# Patient Record
Sex: FEMALE | Race: WHITE | NOT HISPANIC OR LATINO | URBAN - METROPOLITAN AREA
[De-identification: names, ages, dates, MRNs, and addresses within clinical notes are randomized per-mention and may not be internally consistent; named-entity substitution may affect disease eponyms.]

---

## 2018-07-19 ENCOUNTER — INPATIENT (INPATIENT)
Facility: HOSPITAL | Age: 83
LOS: 6 days | Discharge: HOSPICE | End: 2018-07-26
Attending: INTERNAL MEDICINE | Admitting: INTERNAL MEDICINE
Payer: MEDICARE

## 2018-07-19 VITALS
OXYGEN SATURATION: 100 % | WEIGHT: 139.99 LBS | HEART RATE: 88 BPM | SYSTOLIC BLOOD PRESSURE: 167 MMHG | HEIGHT: 67 IN | DIASTOLIC BLOOD PRESSURE: 72 MMHG | RESPIRATION RATE: 20 BRPM

## 2018-07-19 LAB
ABO RH CONFIRMATION: SIGNIFICANT CHANGE UP
ALBUMIN SERPL ELPH-MCNC: 2.4 G/DL — LOW (ref 3.3–5)
ALP SERPL-CCNC: 65 U/L — SIGNIFICANT CHANGE UP (ref 40–120)
ALT FLD-CCNC: 38 U/L — SIGNIFICANT CHANGE UP (ref 12–78)
AMMONIA BLD-MCNC: 24 UMOL/L — SIGNIFICANT CHANGE UP (ref 11–32)
ANION GAP SERPL CALC-SCNC: 11 MMOL/L — SIGNIFICANT CHANGE UP (ref 5–17)
APPEARANCE UR: ABNORMAL
APTT BLD: 40.3 SEC — HIGH (ref 27.5–37.4)
AST SERPL-CCNC: 16 U/L — SIGNIFICANT CHANGE UP (ref 15–37)
BACTERIA # UR AUTO: ABNORMAL
BASE EXCESS BLDA CALC-SCNC: 0.5 MMOL/L — SIGNIFICANT CHANGE UP (ref -2–2)
BASOPHILS # BLD AUTO: 0.03 K/UL — SIGNIFICANT CHANGE UP (ref 0–0.2)
BASOPHILS NFR BLD AUTO: 0.4 % — SIGNIFICANT CHANGE UP (ref 0–2)
BILIRUB SERPL-MCNC: 0.6 MG/DL — SIGNIFICANT CHANGE UP (ref 0.2–1.2)
BILIRUB UR-MCNC: NEGATIVE — SIGNIFICANT CHANGE UP
BLD GP AB SCN SERPL QL: SIGNIFICANT CHANGE UP
BLOOD GAS COMMENTS ARTERIAL: SIGNIFICANT CHANGE UP
BUN SERPL-MCNC: 40 MG/DL — HIGH (ref 7–23)
CALCIUM SERPL-MCNC: 9.1 MG/DL — SIGNIFICANT CHANGE UP (ref 8.5–10.1)
CHLORIDE SERPL-SCNC: 110 MMOL/L — HIGH (ref 96–108)
CK SERPL-CCNC: 25 U/L — LOW (ref 26–192)
CK SERPL-CCNC: 38 U/L — SIGNIFICANT CHANGE UP (ref 26–192)
CO2 SERPL-SCNC: 22 MMOL/L — SIGNIFICANT CHANGE UP (ref 22–31)
COLOR SPEC: YELLOW — SIGNIFICANT CHANGE UP
CREAT SERPL-MCNC: 1.62 MG/DL — HIGH (ref 0.5–1.3)
DIFF PNL FLD: ABNORMAL
EOSINOPHIL # BLD AUTO: 0.17 K/UL — SIGNIFICANT CHANGE UP (ref 0–0.5)
EOSINOPHIL NFR BLD AUTO: 2.2 % — SIGNIFICANT CHANGE UP (ref 0–6)
EPI CELLS # UR: SIGNIFICANT CHANGE UP
ERYTHROCYTE [SEDIMENTATION RATE] IN BLOOD: 87 MM/HR — HIGH (ref 0–20)
GAS PNL BLDA: SIGNIFICANT CHANGE UP
GLUCOSE SERPL-MCNC: 102 MG/DL — HIGH (ref 70–99)
GLUCOSE UR QL: NEGATIVE MG/DL — SIGNIFICANT CHANGE UP
HCO3 BLDA-SCNC: 22 MMOL/L — SIGNIFICANT CHANGE UP (ref 21–29)
HCT VFR BLD CALC: 33.4 % — LOW (ref 34.5–45)
HGB BLD-MCNC: 10.9 G/DL — LOW (ref 11.5–15.5)
HOROWITZ INDEX BLDA+IHG-RTO: 32 — SIGNIFICANT CHANGE UP
IMM GRANULOCYTES NFR BLD AUTO: 1.3 % — SIGNIFICANT CHANGE UP (ref 0–1.5)
INR BLD: 1.24 RATIO — HIGH (ref 0.88–1.16)
KETONES UR-MCNC: ABNORMAL
LACTATE SERPL-SCNC: 1 MMOL/L — SIGNIFICANT CHANGE UP (ref 0.7–2)
LEUKOCYTE ESTERASE UR-ACNC: ABNORMAL
LIDOCAIN IGE QN: 419 U/L — HIGH (ref 73–393)
LYMPHOCYTES # BLD AUTO: 1.04 K/UL — SIGNIFICANT CHANGE UP (ref 1–3.3)
LYMPHOCYTES # BLD AUTO: 13.7 % — SIGNIFICANT CHANGE UP (ref 13–44)
MAGNESIUM SERPL-MCNC: 2 MG/DL — SIGNIFICANT CHANGE UP (ref 1.6–2.6)
MCHC RBC-ENTMCNC: 30.9 PG — SIGNIFICANT CHANGE UP (ref 27–34)
MCHC RBC-ENTMCNC: 32.6 GM/DL — SIGNIFICANT CHANGE UP (ref 32–36)
MCV RBC AUTO: 94.6 FL — SIGNIFICANT CHANGE UP (ref 80–100)
MONOCYTES # BLD AUTO: 0.74 K/UL — SIGNIFICANT CHANGE UP (ref 0–0.9)
MONOCYTES NFR BLD AUTO: 9.7 % — SIGNIFICANT CHANGE UP (ref 2–14)
NEUTROPHILS # BLD AUTO: 5.51 K/UL — SIGNIFICANT CHANGE UP (ref 1.8–7.4)
NEUTROPHILS NFR BLD AUTO: 72.7 % — SIGNIFICANT CHANGE UP (ref 43–77)
NITRITE UR-MCNC: POSITIVE
NRBC # BLD: 0 /100 WBCS — SIGNIFICANT CHANGE UP (ref 0–0)
NT-PROBNP SERPL-SCNC: 583 PG/ML — HIGH (ref 0–450)
PCO2 BLDA: 26 MMHG — LOW (ref 32–46)
PH BLDA: 7.54 — HIGH (ref 7.35–7.45)
PH UR: 6 — SIGNIFICANT CHANGE UP (ref 5–8)
PLATELET # BLD AUTO: 335 K/UL — SIGNIFICANT CHANGE UP (ref 150–400)
PO2 BLDA: 139 MMHG — HIGH (ref 74–108)
POTASSIUM SERPL-MCNC: 3.7 MMOL/L — SIGNIFICANT CHANGE UP (ref 3.5–5.3)
POTASSIUM SERPL-SCNC: 3.7 MMOL/L — SIGNIFICANT CHANGE UP (ref 3.5–5.3)
PROT SERPL-MCNC: 6.6 GM/DL — SIGNIFICANT CHANGE UP (ref 6–8.3)
PROT UR-MCNC: 30 MG/DL
PROTHROM AB SERPL-ACNC: 13.5 SEC — HIGH (ref 9.8–12.7)
RBC # BLD: 3.53 M/UL — LOW (ref 3.8–5.2)
RBC # FLD: 13 % — SIGNIFICANT CHANGE UP (ref 10.3–14.5)
RBC CASTS # UR COMP ASSIST: ABNORMAL /HPF (ref 0–4)
SAO2 % BLDA: 99 % — HIGH (ref 92–96)
SODIUM SERPL-SCNC: 143 MMOL/L — SIGNIFICANT CHANGE UP (ref 135–145)
SP GR SPEC: 1.01 — SIGNIFICANT CHANGE UP (ref 1.01–1.02)
TROPONIN I SERPL-MCNC: <0.015 NG/ML — SIGNIFICANT CHANGE UP (ref 0.01–0.04)
TROPONIN I SERPL-MCNC: <0.015 NG/ML — SIGNIFICANT CHANGE UP (ref 0.01–0.04)
TSH SERPL-MCNC: 2.58 UU/ML — SIGNIFICANT CHANGE UP (ref 0.36–3.74)
TYPE + AB SCN PNL BLD: SIGNIFICANT CHANGE UP
UROBILINOGEN FLD QL: NEGATIVE MG/DL — SIGNIFICANT CHANGE UP
WBC # BLD: 7.59 K/UL — SIGNIFICANT CHANGE UP (ref 3.8–10.5)
WBC # FLD AUTO: 7.59 K/UL — SIGNIFICANT CHANGE UP (ref 3.8–10.5)
WBC UR QL: >50

## 2018-07-19 PROCEDURE — 71250 CT THORAX DX C-: CPT | Mod: 26

## 2018-07-19 PROCEDURE — 73060 X-RAY EXAM OF HUMERUS: CPT | Mod: 26,LT

## 2018-07-19 PROCEDURE — 93970 EXTREMITY STUDY: CPT | Mod: 26

## 2018-07-19 PROCEDURE — 74176 CT ABD & PELVIS W/O CONTRAST: CPT | Mod: 26

## 2018-07-19 PROCEDURE — 73030 X-RAY EXAM OF SHOULDER: CPT | Mod: 26,LT

## 2018-07-19 PROCEDURE — 71045 X-RAY EXAM CHEST 1 VIEW: CPT | Mod: 26

## 2018-07-19 PROCEDURE — 70450 CT HEAD/BRAIN W/O DYE: CPT | Mod: 26

## 2018-07-19 PROCEDURE — 99285 EMERGENCY DEPT VISIT HI MDM: CPT

## 2018-07-19 PROCEDURE — 93010 ELECTROCARDIOGRAM REPORT: CPT

## 2018-07-19 RX ORDER — CEFTRIAXONE 500 MG/1
1000 INJECTION, POWDER, FOR SOLUTION INTRAMUSCULAR; INTRAVENOUS ONCE
Qty: 0 | Refills: 0 | Status: COMPLETED | OUTPATIENT
Start: 2018-07-19 | End: 2018-07-19

## 2018-07-19 RX ORDER — ONDANSETRON 8 MG/1
4 TABLET, FILM COATED ORAL EVERY 6 HOURS
Qty: 0 | Refills: 0 | Status: DISCONTINUED | OUTPATIENT
Start: 2018-07-19 | End: 2018-07-26

## 2018-07-19 RX ORDER — MODAFINIL 200 MG/1
100 TABLET ORAL DAILY
Qty: 0 | Refills: 0 | Status: DISCONTINUED | OUTPATIENT
Start: 2018-07-19 | End: 2018-07-26

## 2018-07-19 RX ORDER — LEVOTHYROXINE SODIUM 125 MCG
1 TABLET ORAL
Qty: 0 | Refills: 0 | COMMUNITY

## 2018-07-19 RX ORDER — HEPARIN SODIUM 5000 [USP'U]/ML
5000 INJECTION INTRAVENOUS; SUBCUTANEOUS EVERY 12 HOURS
Qty: 0 | Refills: 0 | Status: DISCONTINUED | OUTPATIENT
Start: 2018-07-19 | End: 2018-07-26

## 2018-07-19 RX ORDER — SENNA PLUS 8.6 MG/1
2 TABLET ORAL AT BEDTIME
Qty: 0 | Refills: 0 | Status: DISCONTINUED | OUTPATIENT
Start: 2018-07-19 | End: 2018-07-26

## 2018-07-19 RX ORDER — MUPIROCIN 20 MG/G
1 OINTMENT TOPICAL EVERY 12 HOURS
Qty: 0 | Refills: 0 | Status: DISCONTINUED | OUTPATIENT
Start: 2018-07-19 | End: 2018-07-26

## 2018-07-19 RX ORDER — ASPIRIN/CALCIUM CARB/MAGNESIUM 324 MG
81 TABLET ORAL DAILY
Qty: 0 | Refills: 0 | Status: DISCONTINUED | OUTPATIENT
Start: 2018-07-19 | End: 2018-07-26

## 2018-07-19 RX ORDER — CEFTRIAXONE 500 MG/1
1 INJECTION, POWDER, FOR SOLUTION INTRAMUSCULAR; INTRAVENOUS EVERY 24 HOURS
Qty: 0 | Refills: 0 | Status: DISCONTINUED | OUTPATIENT
Start: 2018-07-19 | End: 2018-07-19

## 2018-07-19 RX ORDER — POLYETHYLENE GLYCOL 3350 17 G/17G
17 POWDER, FOR SOLUTION ORAL DAILY
Qty: 0 | Refills: 0 | Status: DISCONTINUED | OUTPATIENT
Start: 2018-07-19 | End: 2018-07-20

## 2018-07-19 RX ORDER — PANTOPRAZOLE SODIUM 20 MG/1
1 TABLET, DELAYED RELEASE ORAL
Qty: 0 | Refills: 0 | COMMUNITY

## 2018-07-19 RX ORDER — LIDOCAINE 4 G/100G
2 CREAM TOPICAL DAILY
Qty: 0 | Refills: 0 | Status: DISCONTINUED | OUTPATIENT
Start: 2018-07-19 | End: 2018-07-26

## 2018-07-19 RX ORDER — ACETAMINOPHEN 500 MG
650 TABLET ORAL EVERY 8 HOURS
Qty: 0 | Refills: 0 | Status: DISCONTINUED | OUTPATIENT
Start: 2018-07-19 | End: 2018-07-26

## 2018-07-19 RX ORDER — LEVOTHYROXINE SODIUM 125 MCG
25 TABLET ORAL DAILY
Qty: 0 | Refills: 0 | Status: DISCONTINUED | OUTPATIENT
Start: 2018-07-19 | End: 2018-07-26

## 2018-07-19 RX ORDER — DEXTROSE MONOHYDRATE, SODIUM CHLORIDE, AND POTASSIUM CHLORIDE 50; .745; 4.5 G/1000ML; G/1000ML; G/1000ML
1000 INJECTION, SOLUTION INTRAVENOUS
Qty: 0 | Refills: 0 | Status: DISCONTINUED | OUTPATIENT
Start: 2018-07-19 | End: 2018-07-26

## 2018-07-19 RX ORDER — CARVEDILOL PHOSPHATE 80 MG/1
12.5 CAPSULE, EXTENDED RELEASE ORAL EVERY 12 HOURS
Qty: 0 | Refills: 0 | Status: DISCONTINUED | OUTPATIENT
Start: 2018-07-19 | End: 2018-07-26

## 2018-07-19 RX ORDER — PANTOPRAZOLE SODIUM 20 MG/1
40 TABLET, DELAYED RELEASE ORAL
Qty: 0 | Refills: 0 | Status: DISCONTINUED | OUTPATIENT
Start: 2018-07-19 | End: 2018-07-26

## 2018-07-19 RX ORDER — CEFTRIAXONE 500 MG/1
1000 INJECTION, POWDER, FOR SOLUTION INTRAMUSCULAR; INTRAVENOUS EVERY 24 HOURS
Qty: 0 | Refills: 0 | Status: DISCONTINUED | OUTPATIENT
Start: 2018-07-19 | End: 2018-07-24

## 2018-07-19 RX ORDER — ATORVASTATIN CALCIUM 80 MG/1
40 TABLET, FILM COATED ORAL AT BEDTIME
Qty: 0 | Refills: 0 | Status: DISCONTINUED | OUTPATIENT
Start: 2018-07-19 | End: 2018-07-26

## 2018-07-19 RX ORDER — DOCUSATE SODIUM 100 MG
100 CAPSULE ORAL THREE TIMES A DAY
Qty: 0 | Refills: 0 | Status: DISCONTINUED | OUTPATIENT
Start: 2018-07-19 | End: 2018-07-26

## 2018-07-19 RX ORDER — ACETAMINOPHEN 500 MG
1000 TABLET ORAL ONCE
Qty: 0 | Refills: 0 | Status: COMPLETED | OUTPATIENT
Start: 2018-07-19 | End: 2018-07-19

## 2018-07-19 RX ADMIN — CARVEDILOL PHOSPHATE 12.5 MILLIGRAM(S): 80 CAPSULE, EXTENDED RELEASE ORAL at 18:55

## 2018-07-19 RX ADMIN — CEFTRIAXONE 1000 MILLIGRAM(S): 500 INJECTION, POWDER, FOR SOLUTION INTRAMUSCULAR; INTRAVENOUS at 14:55

## 2018-07-19 RX ADMIN — LIDOCAINE 2 PATCH: 4 CREAM TOPICAL at 18:54

## 2018-07-19 RX ADMIN — DEXTROSE MONOHYDRATE, SODIUM CHLORIDE, AND POTASSIUM CHLORIDE 50 MILLILITER(S): 50; .745; 4.5 INJECTION, SOLUTION INTRAVENOUS at 22:27

## 2018-07-19 RX ADMIN — HEPARIN SODIUM 5000 UNIT(S): 5000 INJECTION INTRAVENOUS; SUBCUTANEOUS at 18:55

## 2018-07-19 RX ADMIN — Medication 400 MILLIGRAM(S): at 18:53

## 2018-07-19 NOTE — H&P ADULT - PMH
Back pain    Depression    Dysphagia    HTN (hypertension)    Hypothyroidism    Narcolepsy and cataplexy    Shoulder pain, left    Urinary retention

## 2018-07-19 NOTE — ED ADULT NURSE REASSESSMENT NOTE - NS ED NURSE REASSESS COMMENT FT1
bedside ultrasound done, pt transported to [facility]
Patient resting comfortably in bed, VSS. Awaiting CT scan results. Hospitalist at bedside, awaiting admitting orders.  at bedside.

## 2018-07-19 NOTE — ED PROVIDER NOTE - CARE PLAN
Principal Discharge DX:	Altered mental status Principal Discharge DX:	UTI (urinary tract infection)  Secondary Diagnosis:	Metabolic encephalopathy

## 2018-07-19 NOTE — ED ADULT NURSE NOTE - OBJECTIVE STATEMENT
Patient is 89 year old female from [place] rehab comes to ER for increase in lethargy. Patient was seen recently at [facility]. All results WNL. Patient d/c to [place] for rehab. Patient recently placed on new depression meds, she was seen to have change in mental status. Med discontinued. On arrival patient afebrile. Skin tear noted to inner right thigh, left calf, and right hand. Prior to arrival patient had #24 IV placed in right hand from [place] with noticeable skin tear, unable to flush IV. IV removed, Skin tear bandaged. Patient had metcalf prior to arrival. Metcalf changed and draining clear yellow urine. Multiple attempts made for IV access and blood work. IV team got #22 in right AC, flushing. Mult attempts for blood work, unsuccessful, MD made aware. Patient  is at bedside.

## 2018-07-19 NOTE — ED ADULT TRIAGE NOTE - CHIEF COMPLAINT QUOTE
increased lethargy, change in LOC x 3 weeks. In rehab, daughter feels like she is not being treated right and wanted to be brought to .

## 2018-07-19 NOTE — ED PROVIDER NOTE - SKIN, MLM
Skin normal color for race, warm, dry. No evidence of rash. +diffuse ecchymosis on UE, LE at various stages.

## 2018-07-19 NOTE — ED PROVIDER NOTE - OBJECTIVE STATEMENT
89 y/o female with PMHx of depression, HTN presents to the ED c/o AMS and lethargy starting 3 weeks ago. Pt was seen at [facility] for weakness/dizziness 3 weeks ago. As per daughter pt has been antidepressants all her life, but was recently switched from 100mg to 50mg 6 months ago and was recently changed to 25mg when pt began experiencing weakness the doctor increased the dose. While pt was at [place] she was in and out of consciousness and unresponsive, they did labs, MRI and other tests were all normal. Pt was sent to rehab, but family wasn't satisfied that pt was still unresponsive at time and decided to bring her to [facility] for evaluation. HPI obtained from family as pt is not responding to questions at this time.

## 2018-07-19 NOTE — H&P ADULT - NSHPPHYSICALEXAM_GEN_ALL_CORE
PHYSICAL EXAM:    Constitutional: lethargic, awake    HEENT: PERR, EOMI, Normal Hearing, MMM  Neck: Soft and supple, No LAD, No JVD  Respiratory: Breath sounds decreased at bases,  No wheezing, rales , + occasional  rhonchi  Cardiovascular: S1 and S2, regular rate and rhythm, no Murmurs, gallops or rubs  Gastrointestinal: Bowel Sounds present, soft, diffuse tenderness, mildly distended, no guarding, no rebound  Extremities: + 2  peripheral edema  Vascular: 1+ peripheral pulses  Neurological: A/O x 0, unable to perform due to AMS   Musculoskeletal:  LUE deformity around the shoulder, multiple superficial bruises on the arm and legs, skin tear on the inner right thigh  Skin: multiple skin tear on the right leg, + bruises   rectal : deferred, not indicated

## 2018-07-19 NOTE — H&P ADULT - ASSESSMENT
89 y/o female with PMHx of  long standing h/o depression was weaned by PCP  of desipramine recently, Narcolepsy , GERD, Urinary retention s/p Dill , Constipation, Iron deficiency  HTN, Hypothyroidism, Dysphagia  presents to the   from [facility]   with worsening of lethargy starting 3 weeks ago. Pt was seen at [facility] for weakness/dizziness 3 weeks ago, all work-up was negative .  As per daughter at bedside pt  was in and out of consciousness and unresponsive during hospitalization. MRI and other tests were all normal.  Patient awake, speaks some words but unable to provide any history. Patient looks uncomfortable. Patient is nonambulatory  recently , has h/o left arm fracture with defocrmity     in ED T(F): 99.1 , Max: 99.1  HR: 86  (86 - 98) BP: 164/74 (129/80 - 167/72) RR: 18  (17 - 20) SpO2: 100%  (100% - 100%) on 4 L , EKG - sinus 95, NSST changes, troponin x 1 neg, TSH wnl, Cr 1.62, BUN 40 , Hb 10.9, alb 2.4, CK 25,   s/p 1 gm of ceftriaxone, CXR - ?LLL PNA, official reading is pending    * Metabolic encephalopathy due to UTI , HAILEY vs CVA  vs worsening of dementia vs narcolepsy   * UTI   - tele  - serial troponin, ekg  - 2 d echo  - MRI/MRA  - neurology, cardiology evaluation  - f/u cultures  - CT head - large left mastoid air effusion - will do MRI for better evaluation  - CT chest /abd /pelv to r/o source   - ID evaluation  - check TSH, B12   - speech and swallow evaluation  - PT   - c/w ASA, statins    * HAILEY   * h/o Urinary retention s/p Dill   - IV fluids - gentle   - repeat in am  - CT abd to r/o obstruction     * Dyspnea  r/o PNA  vs PE   - CT chest  w/o contrast due to HAILEY , consider PE study once renal function improves  - doppler of LE    * MIld anemia  - iron studies  - hold iron for now due to constipation, consider IV iron infusion while in patient  - check ferritin, B12, folate    * Narcolepsy   - modafinil 100 standing    * HTN  - hold ARBs due to HAILEY  - hold nifedipine due to leg edema  ? CHF ( elevated )   - c/w coreg 25 bid  - monitor    * GERD  - c/w PPI    * Dysphagia  - speech and swallow evaluation  - nutirtional consult    * Skin tears, pressure ulcers  - wound care consult    * Advanced directives - 30 minutes spend  -  at  bedside - FULL CODE        DVT proph - heparin      Time spend 70 min

## 2018-07-19 NOTE — H&P ADULT - HISTORY OF PRESENT ILLNESS
89 y/o female with PMHx of  long standing h/o depression was weaned by PCP  of desipramine recently, Narcolepsy , GERD, Urinary retention s/p Dill , Constipation, Iron deficiency  HTN, Hypothyroidism, Dysphagia  presents to the   from [facility]   with worsening of lethargy starting 3 weeks ago. Pt was seen at [facility] for weakness/dizziness 3 weeks ago, all work-up was negative .  As per daughter at bedside pt  was in and out of consciousness and unresponsive during hospitalization. MRI and other tests were all normal.  Patient awake, speaks some words but unable to provide any history. Patient looks uncomfortable. Patient is nonambulatory  recently , has h/o left arm fracture with defocrmity     in ED T(F): 99.1 , Max: 99.1  HR: 86  (86 - 98) BP: 164/74 (129/80 - 167/72) RR: 18  (17 - 20) SpO2: 100%  (100% - 100%) on 4 L , EKG - sinus 95, NSST changes, troponin x 1 neg, TSH wnl, Cr 1.62, BUN 40 , Hb 10.9, alb 2.4, CK 25,   s/p 1 gm of ceftriaxone, CXR - ?LLL PNA, official reading is pending

## 2018-07-20 LAB
ANION GAP SERPL CALC-SCNC: 9 MMOL/L — SIGNIFICANT CHANGE UP (ref 5–17)
BASOPHILS # BLD AUTO: 0.02 K/UL — SIGNIFICANT CHANGE UP (ref 0–0.2)
BASOPHILS NFR BLD AUTO: 0.4 % — SIGNIFICANT CHANGE UP (ref 0–2)
BUN SERPL-MCNC: 37 MG/DL — HIGH (ref 7–23)
CALCIUM SERPL-MCNC: 9 MG/DL — SIGNIFICANT CHANGE UP (ref 8.5–10.1)
CHLORIDE SERPL-SCNC: 112 MMOL/L — HIGH (ref 96–108)
CHOLEST SERPL-MCNC: 142 MG/DL — SIGNIFICANT CHANGE UP (ref 10–199)
CK SERPL-CCNC: 35 U/L — SIGNIFICANT CHANGE UP (ref 26–192)
CO2 SERPL-SCNC: 23 MMOL/L — SIGNIFICANT CHANGE UP (ref 22–31)
CREAT SERPL-MCNC: 1.54 MG/DL — HIGH (ref 0.5–1.3)
CRP SERPL-MCNC: 3.07 MG/DL — HIGH (ref 0–0.4)
EOSINOPHIL # BLD AUTO: 0.17 K/UL — SIGNIFICANT CHANGE UP (ref 0–0.5)
EOSINOPHIL NFR BLD AUTO: 3.2 % — SIGNIFICANT CHANGE UP (ref 0–6)
FERRITIN SERPL-MCNC: 592 NG/ML — HIGH (ref 15–150)
FOLATE SERPL-MCNC: 6.2 NG/ML — SIGNIFICANT CHANGE UP
GLUCOSE SERPL-MCNC: 119 MG/DL — HIGH (ref 70–99)
HCT VFR BLD CALC: 30.1 % — LOW (ref 34.5–45)
HDLC SERPL-MCNC: 31 MG/DL — LOW (ref 40–125)
HGB BLD-MCNC: 10 G/DL — LOW (ref 11.5–15.5)
IMM GRANULOCYTES NFR BLD AUTO: 1.5 % — SIGNIFICANT CHANGE UP (ref 0–1.5)
IRON SATN MFR SERPL: 16 % — SIGNIFICANT CHANGE UP (ref 14–50)
IRON SATN MFR SERPL: 24 UG/DL — LOW (ref 30–160)
LIPID PNL WITH DIRECT LDL SERPL: 76 MG/DL — SIGNIFICANT CHANGE UP
LYMPHOCYTES # BLD AUTO: 0.96 K/UL — LOW (ref 1–3.3)
LYMPHOCYTES # BLD AUTO: 18.3 % — SIGNIFICANT CHANGE UP (ref 13–44)
MAGNESIUM SERPL-MCNC: 2.1 MG/DL — SIGNIFICANT CHANGE UP (ref 1.6–2.6)
MCHC RBC-ENTMCNC: 30.7 PG — SIGNIFICANT CHANGE UP (ref 27–34)
MCHC RBC-ENTMCNC: 33.2 GM/DL — SIGNIFICANT CHANGE UP (ref 32–36)
MCV RBC AUTO: 92.3 FL — SIGNIFICANT CHANGE UP (ref 80–100)
MONOCYTES # BLD AUTO: 0.56 K/UL — SIGNIFICANT CHANGE UP (ref 0–0.9)
MONOCYTES NFR BLD AUTO: 10.7 % — SIGNIFICANT CHANGE UP (ref 2–14)
NEUTROPHILS # BLD AUTO: 3.46 K/UL — SIGNIFICANT CHANGE UP (ref 1.8–7.4)
NEUTROPHILS NFR BLD AUTO: 65.9 % — SIGNIFICANT CHANGE UP (ref 43–77)
NRBC # BLD: 0 /100 WBCS — SIGNIFICANT CHANGE UP (ref 0–0)
NT-PROBNP SERPL-SCNC: 828 PG/ML — HIGH (ref 0–450)
PHOSPHATE SERPL-MCNC: 2.7 MG/DL — SIGNIFICANT CHANGE UP (ref 2.5–4.5)
PLATELET # BLD AUTO: 314 K/UL — SIGNIFICANT CHANGE UP (ref 150–400)
POTASSIUM SERPL-MCNC: 3.7 MMOL/L — SIGNIFICANT CHANGE UP (ref 3.5–5.3)
POTASSIUM SERPL-SCNC: 3.7 MMOL/L — SIGNIFICANT CHANGE UP (ref 3.5–5.3)
RBC # BLD: 3.26 M/UL — LOW (ref 3.8–5.2)
RBC # FLD: 13.2 % — SIGNIFICANT CHANGE UP (ref 10.3–14.5)
SODIUM SERPL-SCNC: 144 MMOL/L — SIGNIFICANT CHANGE UP (ref 135–145)
TIBC SERPL-MCNC: 149 UG/DL — LOW (ref 220–430)
TOTAL CHOLESTEROL/HDL RATIO MEASUREMENT: 4.6 RATIO — SIGNIFICANT CHANGE UP (ref 3.3–7.1)
TRIGL SERPL-MCNC: 174 MG/DL — HIGH (ref 10–149)
TROPONIN I SERPL-MCNC: 0.01 NG/ML — SIGNIFICANT CHANGE UP (ref 0.01–0.04)
TSH SERPL-MCNC: 3.65 UU/ML — SIGNIFICANT CHANGE UP (ref 0.36–3.74)
UIBC SERPL-MCNC: 125 UG/DL — SIGNIFICANT CHANGE UP (ref 110–370)
VIT B12 SERPL-MCNC: 969 PG/ML — SIGNIFICANT CHANGE UP (ref 232–1245)
WBC # BLD: 5.25 K/UL — SIGNIFICANT CHANGE UP (ref 3.8–10.5)
WBC # FLD AUTO: 5.25 K/UL — SIGNIFICANT CHANGE UP (ref 3.8–10.5)

## 2018-07-20 PROCEDURE — 93010 ELECTROCARDIOGRAM REPORT: CPT

## 2018-07-20 PROCEDURE — 99222 1ST HOSP IP/OBS MODERATE 55: CPT

## 2018-07-20 PROCEDURE — 70547 MR ANGIOGRAPHY NECK W/O DYE: CPT | Mod: 26

## 2018-07-20 PROCEDURE — 73030 X-RAY EXAM OF SHOULDER: CPT | Mod: 26,LT

## 2018-07-20 PROCEDURE — 93306 TTE W/DOPPLER COMPLETE: CPT | Mod: 26

## 2018-07-20 PROCEDURE — 70551 MRI BRAIN STEM W/O DYE: CPT | Mod: 26

## 2018-07-20 RX ORDER — POLYETHYLENE GLYCOL 3350 17 G/17G
17 POWDER, FOR SOLUTION ORAL
Qty: 0 | Refills: 0 | Status: DISCONTINUED | OUTPATIENT
Start: 2018-07-20 | End: 2018-07-26

## 2018-07-20 RX ADMIN — CARVEDILOL PHOSPHATE 12.5 MILLIGRAM(S): 80 CAPSULE, EXTENDED RELEASE ORAL at 17:50

## 2018-07-20 RX ADMIN — MUPIROCIN 1 APPLICATION(S): 20 OINTMENT TOPICAL at 06:41

## 2018-07-20 RX ADMIN — POLYETHYLENE GLYCOL 3350 17 GRAM(S): 17 POWDER, FOR SOLUTION ORAL at 17:51

## 2018-07-20 RX ADMIN — Medication 81 MILLIGRAM(S): at 11:49

## 2018-07-20 RX ADMIN — HEPARIN SODIUM 5000 UNIT(S): 5000 INJECTION INTRAVENOUS; SUBCUTANEOUS at 17:50

## 2018-07-20 RX ADMIN — ATORVASTATIN CALCIUM 40 MILLIGRAM(S): 80 TABLET, FILM COATED ORAL at 21:52

## 2018-07-20 RX ADMIN — Medication 100 MILLIGRAM(S): at 21:53

## 2018-07-20 RX ADMIN — Medication 650 MILLIGRAM(S): at 06:34

## 2018-07-20 RX ADMIN — HEPARIN SODIUM 5000 UNIT(S): 5000 INJECTION INTRAVENOUS; SUBCUTANEOUS at 06:35

## 2018-07-20 RX ADMIN — CARVEDILOL PHOSPHATE 12.5 MILLIGRAM(S): 80 CAPSULE, EXTENDED RELEASE ORAL at 06:34

## 2018-07-20 RX ADMIN — LIDOCAINE 2 PATCH: 4 CREAM TOPICAL at 11:44

## 2018-07-20 RX ADMIN — Medication 1 TABLET(S): at 11:49

## 2018-07-20 RX ADMIN — Medication 650 MILLIGRAM(S): at 13:53

## 2018-07-20 RX ADMIN — Medication 650 MILLIGRAM(S): at 21:52

## 2018-07-20 RX ADMIN — MODAFINIL 100 MILLIGRAM(S): 200 TABLET ORAL at 13:53

## 2018-07-20 RX ADMIN — LIDOCAINE 2 PATCH: 4 CREAM TOPICAL at 07:00

## 2018-07-20 RX ADMIN — CEFTRIAXONE 1000 MILLIGRAM(S): 500 INJECTION, POWDER, FOR SOLUTION INTRAMUSCULAR; INTRAVENOUS at 11:43

## 2018-07-20 RX ADMIN — MUPIROCIN 1 APPLICATION(S): 20 OINTMENT TOPICAL at 17:50

## 2018-07-20 RX ADMIN — SENNA PLUS 2 TABLET(S): 8.6 TABLET ORAL at 21:52

## 2018-07-20 RX ADMIN — POLYETHYLENE GLYCOL 3350 17 GRAM(S): 17 POWDER, FOR SOLUTION ORAL at 11:44

## 2018-07-20 NOTE — SWALLOW BEDSIDE ASSESSMENT ADULT - PHARYNGEAL PHASE
Swallow triggered in an acceptable time frame for age with age acceptable laryngeal lift on palpation during swallowing trials. No behavioral aspiration signs exhibited on exam. Odynophagia was denied.

## 2018-07-20 NOTE — CONSULT NOTE ADULT - ASSESSMENT
89 year old woman with chronic depression with subacute presentation of encephalopathy with reported negative workup at [place].  As per the patient's daughter, there is no history of dementia.  Likely a component of toxic encephalopathy in the setting of UTI. However, it is unclear when this problem began. Presumably this was checked during her previous outside hospitalization.  Agree with repeat MRI brain.  There is no reported history of Narcolepsy. She does have somnolence. Narcolepsy does not typically present for the first time at this age. Will try to get more information from  regarding this issue.  That being said, a trial of Modafinil to help with somnolence is warranted and if necessary the dose can be increased from 100 mg/day to 200 mg/day.  Her daughter reports chronic depression and she is only recently off medications. Depression may be playing a role as well. If desipramine (a tricyclic antidepressant) is contraindicated from a cardiac standpoint, can consider starting another class of anti-depressant. Consider psychiatry input.  Physical therapy.  Will follow.

## 2018-07-20 NOTE — SWALLOW BEDSIDE ASSESSMENT ADULT - COMMENTS
The patient was admitted from [facility] with lethargy/altered mentation. This profile is superimposed upon a history of narcolepsy, depression, altered mentation NOS with possible dementia, hypothyroidism, iron deficiency anemia, HTN, HLD, urinary retention s/p Dill placement, Dysphagia NOS, GERD and past left arm fracture with arm deformity.

## 2018-07-20 NOTE — SWALLOW BEDSIDE ASSESSMENT ADULT - ASR SWALLOW LABIAL MOBILITY
Limited probes were notable for generalized reduced effort when executing volitional labial actions but no jeyson lip focality was evident. Note that testing was limited due to reduced participation.

## 2018-07-20 NOTE — SWALLOW BEDSIDE ASSESSMENT ADULT - SWALLOW EVAL: FEEDING ASSISTANCE
PT FED SELF ON EXAM BUT HER WILLINGNESS TO ACCEPT PO WAS REDUCED AT TIMES AND SHE WAS ALSO VARIABLY CONFUSED AT TIMES. THUS, ASSIST SHOULD BE PROVIDED AS NEEDED.

## 2018-07-20 NOTE — PROGRESS NOTE ADULT - ATTENDING COMMENTS
Patient seen and examined.  case reviewed and discussed with NP Melodie, agree with her assessment and plan.  Necessary changes were made.  Patient is lying in bed without any distress.  Discussed with  at bed side regarding management plan.  On IV rocephin for UTI  Follow clinically.

## 2018-07-20 NOTE — PHYSICAL THERAPY INITIAL EVALUATION ADULT - ACTIVE RANGE OF MOTION EXAMINATION, REHAB EVAL
R UE AROM WFL, L UE hand and elbow AROM WFL, L shld NT. B ankle AROM WFL, B hip/knee flex AAROM to 60 deg limited by pt reports of pain, B knee ext WFL

## 2018-07-20 NOTE — PHYSICAL THERAPY INITIAL EVALUATION ADULT - GENERAL OBSERVATIONS, REHAB EVAL
Pt received supine in bed, hard of hearing, states she is always tired and always in pain. Pt vague historian, anxious and tearful at times. Pt currently with Dill cath, NC O2 2L, multiple superficial bruises on arms and legs, multiple skin tears on legs, L shoulder deformity from previous fracture.

## 2018-07-20 NOTE — SWALLOW BEDSIDE ASSESSMENT ADULT - SWALLOW EVAL: CRITERIA FOR SKILLED INTERVENTION MET
I DO NOT FEEL THAT ACUTE SPEECH PATHOLOGY INTERVENTION WOULD CHANGE CLINICAL MANAGEMENT/OUTCOME. MOREOVER, I FEEL THAT HER ORAL DYSPHAGIA AND COGNITIVE DEFICITS ARE CHRONIC PRE-EXISTING CONDITIONS FOR WHICH ACUTE SPEECH PATH INTERVENTION WOULD NOT CHANGE. GIVEN ABOVE, WILL NOT ACTIVELY FOLLOW. RECONSULT PRN AS NEEDED.

## 2018-07-20 NOTE — SWALLOW BEDSIDE ASSESSMENT ADULT - ORAL PREPARATORY PHASE
Pt's willingness to accept Po was psychogenically reduced at times. When she did accept PO, her oral aperture was purposefully reduced but labial grading on utensils was felt to be functional. Pt's willingness to accept PO was psychogenically reduced at times. When she did accept PO, her oral aperture was purposefully reduced but labial grading on utensils was felt to be functional.

## 2018-07-20 NOTE — PHYSICAL THERAPY INITIAL EVALUATION ADULT - CRITERIA FOR SKILLED THERAPEUTIC INTERVENTIONS
functional limitations in following categories/impairments found/therapy frequency/anticipated discharge recommendation/predicted duration of therapy intervention/risk reduction/prevention/rehab potential/anticipated equipment needs at discharge

## 2018-07-20 NOTE — CONSULT NOTE ADULT - ASSESSMENT
89 y/o female with PMHx of  long standing h/o depression was weaned by PCP  of desipramine recently, Narcolepsy , GERD, Urinary retention s/p Metcalf , Constipation, Iron deficiency  HTN, Hypothyroidism, Dysphagia admitted on 7/19 from snf for evaluation of increasing lethargy; history per medical record as patient is unable to provide history.  at bedside unable to provide further information except that each time metcalf is removed it has to be replaced due to lack of her being able to urinate naturally.  1. Patient admitted with lethargy, found to have pyuria, possibly urinary tract infection, has chronic metcalf  - follow up cultures   - reviewed prior medical records to evaluate for resistant or atypical pathogens   - serial cbc and monitor temperature   - iv hydration and supportive care   - agree with ceftriaxone as ordered  2. other issues: depression was weaned by PCP  of desipramine recently, Narcolepsy , GERD, Urinary retention s/p Metcalf , Constipation, Iron deficiency  HTN, Hypothyroidism, Dysphagia   - per medicine

## 2018-07-20 NOTE — SWALLOW BEDSIDE ASSESSMENT ADULT - DIET PRIOR TO ADMI
The patient was reportedly on a EMIL Ground Texture Diet with Thin Liquids at [facility]. The patient was reportedly on a EMIL Ground Texture Diet with Thin Liquids at [facility].

## 2018-07-20 NOTE — SWALLOW BEDSIDE ASSESSMENT ADULT - SWALLOW EVAL: DIAGNOSIS
1) Pt exhibits periodic psychogenically reduced willingness to accept PO atop Oral Dysphagia which subjectively appeared to be a functional condition with a restricted inventory of modified food textures when she is alert enough/willing to accept food. Oral Dysphagia is in setting of variably altered mentation/many missing mandibular teeth. Oral Dysphagia with pre-existing component. Underlying pharyngeal integrity subjectively appeared to be grossly functional/within age acceptable parameters. No behavioral aspiration signs exhibited. Oropharyngeal swallowing integrity likely at pre-hospitalization state.  2)  Pt was alert/interactive but suspectedly [place], internally distractible at times and variably tearful. She was able to verbalize during communicative probes/in conversation without evidence of a primary motor speech or primary linguistic pathology. With that being stated, her utterances reflected variably reduced insight/STM suspect for Cognitive Dysfunction(pre-existing).

## 2018-07-20 NOTE — SWALLOW BEDSIDE ASSESSMENT ADULT - ADDITIONAL RECOMMENDATIONS
1) Talk loudly to pt as she is suspectedly [place].     2) Nutrition follow up. Profile places pt at nutrition risk.    3) Hospitalist follow up. Note that pt with flat affect and is anxious/tearful at times. Question if psych consult would be of benefit. Also note that she has a h/o "altered mentation" and did exhibit some level of Cognitive Dysfunction with a pre-existing component. Question if pt with dementia.

## 2018-07-20 NOTE — PHYSICAL THERAPY INITIAL EVALUATION ADULT - ADDITIONAL COMMENTS
pt states she has not walked in some time, unsure when or what Assistive Device was previously used, pt unsure if she is bedbound or able to sit in chair, became very tearful. Pt states she has not walked in some time, unsure when or what Assistive Device was previously used, pt unsure if she is bedbound or able to sit in chair, became very tearful. As per MD convo with daughter, pt was able to amb very short distances with RW 6 weeks ago, but spent most of her day in a chair.

## 2018-07-20 NOTE — SWALLOW BEDSIDE ASSESSMENT ADULT - SLP GENERAL OBSERVATIONS
Pt was seen at bedside, On encounter she was alert. Her affect was flat. She seemed anxious and tearful at times. The pt was interactive but suspectedly [place], and internally distractible at times. She was able to verbalize during communicative probes/in conversation without evidence of a primary motor speech or primary linguistic pathology. With that being stated, her utterances were often brief/tangential and reflected variably reduced insight/STM suspect for Cognitive Dysfunction which is reportedly pre-existing. Question if pt with underlying dementia. She is a vague historian.

## 2018-07-20 NOTE — CONSULT NOTE ADULT - ASSESSMENT
Abnormal EKG- her EKG at the time of admission showed only non specific ST T changes.  No anginal symptoms.  Cardiac enzymes did not reveal any ischemia.  I do not think this EKG changes might have caused her altered mental status.  Evaluate for other etiolgies.  Obtain records from her last hospitalization including EKG.    HTN- continue coreg.    Hyperlipidemia- continue statin.  Other medical issues- Management per primary team.   Thank you for allowing me to participate in the care of this patient. Please feel free to contact me with any questions.

## 2018-07-20 NOTE — SWALLOW BEDSIDE ASSESSMENT ADULT - ORAL PHASE
Bolus formation/transfer with mechanical soft solids was characterized by moderately to excessively prolonged discontinuous munch rotary masticatory motions and she often verbalized while masticating. Bolus formation/transfer with pureed foods-liquids were achieved via mildly prolonged/discontinuous but functional AP lingual actions. Bolus formation/transfer with mechanical soft solids was characterized by moderately to excessively prolonged discontinuous munch rotary masticatory motions and she often verbalized while masticating. Bolus formation/transfer with pureed foods-liquids were achieved via mildly prolonged/discontinuous but functional AP lingual actions. Piecemeal deglutition was evident. Tongue debris noted with mech soft solids only.

## 2018-07-20 NOTE — SWALLOW BEDSIDE ASSESSMENT ADULT - ASPIRATION PRECAUTIONS
Maintain aspiration precautions as a conservative measure. Note that no aspiration signs were demonstrated on clinical exam but her relative risk for episodic aspiration is heightened given her variably altered mentation./yes

## 2018-07-20 NOTE — SWALLOW BEDSIDE ASSESSMENT ADULT - SWALLOW EVAL: RECOMMENDED DIET
SUGGEST A PUREE TEXTURE DIET WITH THIN LIQUID CONSISTENCIES AS THIS DIET CONSISTENCY BEST ACCOMMODATES HER OROPHARYNGEAL SWALLOWING PROFILE AT THIS TIME IN HOSPITAL. NOTE THAT PT WAS ON A GROUND TEXTURE DIET WITH THIN LIQUIDS PTH AT [facility], BUT THIS HOSPITAL DOES NOT OFFER A DIET TYPE OF SIMILAR CONSISTENCY. OUR MECHANICAL SOFT TEXTURE DIET AT HOSPITAL WOULD BE TOO COARSE FOR HER TO CONSISTENTLY MANAGE. THUS, I STILL SUGGEST A PUREE TEXTURE DIET W/THIN LIQUIDS AT THIS TIME.

## 2018-07-20 NOTE — PHYSICAL THERAPY INITIAL EVALUATION ADULT - MODALITIES TREATMENT COMMENTS
Bed mobility and out of bed assessment deferred due to patient reports of fatigue and pain, and potential new dislocation of L shoulder.

## 2018-07-20 NOTE — PHYSICAL THERAPY INITIAL EVALUATION ADULT - PERTINENT HX OF CURRENT PROBLEM, REHAB EVAL
89yo female presents to ED with AMS and lethargy. As per ED intake completed by family, pt was seen at [place] 3 weeks ago for weakness, all work up was negative, transferred to [facility] but AMS continued and pt was in/out of consciousness, family requested transfer to  for further evaluation. Pt nonambulatory

## 2018-07-20 NOTE — SWALLOW BEDSIDE ASSESSMENT ADULT - ASR SWALLOW LINGUAL MOBILITY
Limited probes were notable for generalized reduced effort when executing volitional lingual actions but no jeyson tongue focality was evident. Note that testing was limited due to reduced participation.

## 2018-07-21 LAB
-  AMIKACIN: SIGNIFICANT CHANGE UP
-  AMOXICILLIN/CLAVULANIC ACID: SIGNIFICANT CHANGE UP
-  AMPICILLIN/SULBACTAM: SIGNIFICANT CHANGE UP
-  AMPICILLIN: SIGNIFICANT CHANGE UP
-  AZTREONAM: SIGNIFICANT CHANGE UP
-  CEFAZOLIN: SIGNIFICANT CHANGE UP
-  CEFEPIME: SIGNIFICANT CHANGE UP
-  CEFOXITIN: SIGNIFICANT CHANGE UP
-  CEFTRIAXONE: SIGNIFICANT CHANGE UP
-  CIPROFLOXACIN: SIGNIFICANT CHANGE UP
-  ERTAPENEM: SIGNIFICANT CHANGE UP
-  GENTAMICIN: SIGNIFICANT CHANGE UP
-  IMIPENEM: SIGNIFICANT CHANGE UP
-  LEVOFLOXACIN: SIGNIFICANT CHANGE UP
-  MEROPENEM: SIGNIFICANT CHANGE UP
-  NITROFURANTOIN: SIGNIFICANT CHANGE UP
-  PIPERACILLIN/TAZOBACTAM: SIGNIFICANT CHANGE UP
-  TIGECYCLINE: SIGNIFICANT CHANGE UP
-  TOBRAMYCIN: SIGNIFICANT CHANGE UP
-  TRIMETHOPRIM/SULFAMETHOXAZOLE: SIGNIFICANT CHANGE UP
APPEARANCE UR: CLEAR — SIGNIFICANT CHANGE UP
BACTERIA # UR AUTO: ABNORMAL
BILIRUB UR-MCNC: NEGATIVE — SIGNIFICANT CHANGE UP
COLOR SPEC: YELLOW — SIGNIFICANT CHANGE UP
CULTURE RESULTS: SIGNIFICANT CHANGE UP
DIFF PNL FLD: ABNORMAL
EPI CELLS # UR: SIGNIFICANT CHANGE UP
GLUCOSE UR QL: NEGATIVE MG/DL — SIGNIFICANT CHANGE UP
KETONES UR-MCNC: ABNORMAL
LEUKOCYTE ESTERASE UR-ACNC: ABNORMAL
METHOD TYPE: SIGNIFICANT CHANGE UP
NITRITE UR-MCNC: NEGATIVE — SIGNIFICANT CHANGE UP
NT-PROBNP SERPL-SCNC: 798 PG/ML — HIGH (ref 0–450)
ORGANISM # SPEC MICROSCOPIC CNT: SIGNIFICANT CHANGE UP
ORGANISM # SPEC MICROSCOPIC CNT: SIGNIFICANT CHANGE UP
PH UR: 5 — SIGNIFICANT CHANGE UP (ref 5–8)
PROT UR-MCNC: 30 MG/DL
RBC CASTS # UR COMP ASSIST: ABNORMAL /HPF (ref 0–4)
SP GR SPEC: 1.02 — SIGNIFICANT CHANGE UP (ref 1.01–1.02)
SPECIMEN SOURCE: SIGNIFICANT CHANGE UP
UROBILINOGEN FLD QL: NEGATIVE MG/DL — SIGNIFICANT CHANGE UP
WBC UR QL: ABNORMAL

## 2018-07-21 PROCEDURE — 93010 ELECTROCARDIOGRAM REPORT: CPT

## 2018-07-21 PROCEDURE — 99233 SBSQ HOSP IP/OBS HIGH 50: CPT

## 2018-07-21 RX ADMIN — MODAFINIL 100 MILLIGRAM(S): 200 TABLET ORAL at 11:35

## 2018-07-21 RX ADMIN — PANTOPRAZOLE SODIUM 40 MILLIGRAM(S): 20 TABLET, DELAYED RELEASE ORAL at 06:29

## 2018-07-21 RX ADMIN — POLYETHYLENE GLYCOL 3350 17 GRAM(S): 17 POWDER, FOR SOLUTION ORAL at 06:29

## 2018-07-21 RX ADMIN — Medication 1 TABLET(S): at 11:35

## 2018-07-21 RX ADMIN — Medication 650 MILLIGRAM(S): at 06:28

## 2018-07-21 RX ADMIN — MUPIROCIN 1 APPLICATION(S): 20 OINTMENT TOPICAL at 18:20

## 2018-07-21 RX ADMIN — HEPARIN SODIUM 5000 UNIT(S): 5000 INJECTION INTRAVENOUS; SUBCUTANEOUS at 18:19

## 2018-07-21 RX ADMIN — LIDOCAINE 2 PATCH: 4 CREAM TOPICAL at 00:02

## 2018-07-21 RX ADMIN — CEFTRIAXONE 1000 MILLIGRAM(S): 500 INJECTION, POWDER, FOR SOLUTION INTRAMUSCULAR; INTRAVENOUS at 11:36

## 2018-07-21 RX ADMIN — Medication 100 MILLIGRAM(S): at 06:28

## 2018-07-21 RX ADMIN — Medication 650 MILLIGRAM(S): at 00:22

## 2018-07-21 RX ADMIN — HEPARIN SODIUM 5000 UNIT(S): 5000 INJECTION INTRAVENOUS; SUBCUTANEOUS at 06:28

## 2018-07-21 RX ADMIN — CARVEDILOL PHOSPHATE 12.5 MILLIGRAM(S): 80 CAPSULE, EXTENDED RELEASE ORAL at 18:19

## 2018-07-21 RX ADMIN — Medication 25 MICROGRAM(S): at 06:29

## 2018-07-21 RX ADMIN — Medication 81 MILLIGRAM(S): at 11:36

## 2018-07-21 RX ADMIN — MUPIROCIN 1 APPLICATION(S): 20 OINTMENT TOPICAL at 06:29

## 2018-07-21 RX ADMIN — CARVEDILOL PHOSPHATE 12.5 MILLIGRAM(S): 80 CAPSULE, EXTENDED RELEASE ORAL at 06:28

## 2018-07-21 RX ADMIN — LIDOCAINE 2 PATCH: 4 CREAM TOPICAL at 11:35

## 2018-07-21 NOTE — DIETITIAN INITIAL EVALUATION ADULT. - OTHER INFO
Pt is 89 y/o female with PMHx of  long standing h/o depression, GERD, Urinary retention s/p Dill , Constipation, Iron deficiency  HTN, Hypothyroidism, Dysphagia  presents to the   from SNF/rehab  with worsening of lethargy starting 3 weeks ago. Pt was seen at [facility] for weakness/dizziness 3 weeks ago, all work-up was negative .  As per daughter at bedside pt  was in and out of consciousness and unresponsive during hospitalization. MRI and other tests were all normal. Pt unable to provide hx.  Issues: metabolic encephalopathy, dysphagia, HAILEY, dyspnea, mild anemia, narcolepsy, HTN, GERD, pt is full code. Pt is 89 y/o female with PMHx of  long standing h/o depression, GERD, Urinary retention s/p Dill , Constipation, Iron deficiency  HTN, Hypothyroidism, Dysphagia  presents to the   from SNF/rehab  with worsening of lethargy starting 3 weeks ago. Pt was seen at [facility] for weakness/dizziness 3 weeks ago, all work-up was negative .  As per daughter at bedside pt  was in and out of consciousness and unresponsive during hospitalization. MRI and other tests were all normal. Pt unable to provide hx.  Issues: metabolic encephalopathy, dysphagia, HAILEY, dyspnea, mild anemia, narcolepsy, HTN, GERD, pt is full code. Pt has altered mentation, spoke with pt's  who reports that pt was at [place], then at rehab, and did not eat well for over a month.  At , pt does not like the pureed food, so she is not eating well here.  Pt seen by SLP, who recommends pureed diet with thin liquids.  Pt was on ground diet at rehab.  Pt currently on NT liquids, suggest liberalize this order to regular.  Pt meets criteria for severe protein-calorie malnutrition in context of acute condition, etiology unknown.  NFPE reveals moderate generalized muscle wasting, moderate generalized fat depletion, seen mostly on observation as pt's  asked that pt not be disturbed further.  Pt's  reports that pt has lost 3.75% UBW in one month, PO intake < 50% nutritional needs > one month.  Suggest maintain current pureed diet, suggest re-test to add more palatable food for pt.  Suggest change liquids to NT, as per SLP.  Suggest add Ensure enlive 8 oz tid (NT), add Ensure pudding TID.  Add applesauce to each meal.  Pt's  feels pt would eat if food were more palatable to her.  Suggest weekly weights, record PO intake in EMR after each meal. Pt requires feeding assistance. Will monitor PO intake, tolerance, labs and weight.

## 2018-07-21 NOTE — DIETITIAN INITIAL EVALUATION ADULT. - ENERGY NEEDS
Ht.     67 "        Wt.   70     kg               BMI    24.2              IBW     61  kg               Pt is at    115%  IBW

## 2018-07-21 NOTE — CONSULT NOTE ADULT - ASSESSMENT
A/P: 89y Female with chronic L proximal humerus shaft pseudoarthrosis    based on exam and hx and imaging injury is chronic in nature and no need for any further orthopedic mgmt  Pain control  WBAT LUE  Active movement of fingers/elbow encouraged  conservative mgmt  no orthopedic surgical intervention at this time  c/w medical care as per primary team  All question answered  Follow up with Dr. emmanuel as needed as outpatient after DC from hospital call office for appointment   Ortho stable

## 2018-07-21 NOTE — DIETITIAN INITIAL EVALUATION ADULT. - PERTINENT LABORATORY DATA
7/20   Cl 112H    BUN 37H   Cr 1.54H    Glu 119H    GFR 29L    TG 174H    7/19   CRP  3.07H    alb 2.4L

## 2018-07-22 RX ORDER — SODIUM CHLORIDE 9 MG/ML
1000 INJECTION, SOLUTION INTRAVENOUS
Qty: 0 | Refills: 0 | Status: DISCONTINUED | OUTPATIENT
Start: 2018-07-22 | End: 2018-07-26

## 2018-07-22 RX ORDER — LANOLIN ALCOHOL/MO/W.PET/CERES
5 CREAM (GRAM) TOPICAL AT BEDTIME
Qty: 0 | Refills: 0 | Status: DISCONTINUED | OUTPATIENT
Start: 2018-07-22 | End: 2018-07-26

## 2018-07-22 RX ADMIN — LIDOCAINE 2 PATCH: 4 CREAM TOPICAL at 12:08

## 2018-07-22 RX ADMIN — POLYETHYLENE GLYCOL 3350 17 GRAM(S): 17 POWDER, FOR SOLUTION ORAL at 18:14

## 2018-07-22 RX ADMIN — CEFTRIAXONE 1000 MILLIGRAM(S): 500 INJECTION, POWDER, FOR SOLUTION INTRAMUSCULAR; INTRAVENOUS at 12:07

## 2018-07-22 RX ADMIN — SENNA PLUS 2 TABLET(S): 8.6 TABLET ORAL at 22:04

## 2018-07-22 RX ADMIN — Medication 650 MILLIGRAM(S): at 06:32

## 2018-07-22 RX ADMIN — MUPIROCIN 1 APPLICATION(S): 20 OINTMENT TOPICAL at 06:34

## 2018-07-22 RX ADMIN — Medication 25 MICROGRAM(S): at 06:32

## 2018-07-22 RX ADMIN — MUPIROCIN 1 APPLICATION(S): 20 OINTMENT TOPICAL at 18:15

## 2018-07-22 RX ADMIN — Medication 100 MILLIGRAM(S): at 06:33

## 2018-07-22 RX ADMIN — HEPARIN SODIUM 5000 UNIT(S): 5000 INJECTION INTRAVENOUS; SUBCUTANEOUS at 06:46

## 2018-07-22 RX ADMIN — Medication 5 MILLIGRAM(S): at 22:16

## 2018-07-22 RX ADMIN — HEPARIN SODIUM 5000 UNIT(S): 5000 INJECTION INTRAVENOUS; SUBCUTANEOUS at 18:13

## 2018-07-22 RX ADMIN — POLYETHYLENE GLYCOL 3350 17 GRAM(S): 17 POWDER, FOR SOLUTION ORAL at 06:32

## 2018-07-22 RX ADMIN — Medication 81 MILLIGRAM(S): at 18:16

## 2018-07-22 RX ADMIN — Medication 650 MILLIGRAM(S): at 22:04

## 2018-07-22 RX ADMIN — ATORVASTATIN CALCIUM 40 MILLIGRAM(S): 80 TABLET, FILM COATED ORAL at 22:04

## 2018-07-22 RX ADMIN — CARVEDILOL PHOSPHATE 12.5 MILLIGRAM(S): 80 CAPSULE, EXTENDED RELEASE ORAL at 06:33

## 2018-07-22 RX ADMIN — SODIUM CHLORIDE 60 MILLILITER(S): 9 INJECTION, SOLUTION INTRAVENOUS at 15:41

## 2018-07-22 RX ADMIN — PANTOPRAZOLE SODIUM 40 MILLIGRAM(S): 20 TABLET, DELAYED RELEASE ORAL at 06:32

## 2018-07-22 RX ADMIN — CARVEDILOL PHOSPHATE 12.5 MILLIGRAM(S): 80 CAPSULE, EXTENDED RELEASE ORAL at 18:14

## 2018-07-23 LAB
ANION GAP SERPL CALC-SCNC: 7 MMOL/L — SIGNIFICANT CHANGE UP (ref 5–17)
BUN SERPL-MCNC: 24 MG/DL — HIGH (ref 7–23)
CALCIUM SERPL-MCNC: 9.4 MG/DL — SIGNIFICANT CHANGE UP (ref 8.5–10.1)
CHLORIDE SERPL-SCNC: 113 MMOL/L — HIGH (ref 96–108)
CO2 SERPL-SCNC: 26 MMOL/L — SIGNIFICANT CHANGE UP (ref 22–31)
CREAT SERPL-MCNC: 1.49 MG/DL — HIGH (ref 0.5–1.3)
GLUCOSE SERPL-MCNC: 119 MG/DL — HIGH (ref 70–99)
POTASSIUM SERPL-MCNC: 3.9 MMOL/L — SIGNIFICANT CHANGE UP (ref 3.5–5.3)
POTASSIUM SERPL-SCNC: 3.9 MMOL/L — SIGNIFICANT CHANGE UP (ref 3.5–5.3)
SODIUM SERPL-SCNC: 146 MMOL/L — HIGH (ref 135–145)

## 2018-07-23 RX ADMIN — HEPARIN SODIUM 5000 UNIT(S): 5000 INJECTION INTRAVENOUS; SUBCUTANEOUS at 17:33

## 2018-07-23 RX ADMIN — CEFTRIAXONE 1000 MILLIGRAM(S): 500 INJECTION, POWDER, FOR SOLUTION INTRAMUSCULAR; INTRAVENOUS at 11:25

## 2018-07-23 RX ADMIN — LIDOCAINE 2 PATCH: 4 CREAM TOPICAL at 08:56

## 2018-07-23 RX ADMIN — LIDOCAINE 2 PATCH: 4 CREAM TOPICAL at 21:11

## 2018-07-23 RX ADMIN — MUPIROCIN 1 APPLICATION(S): 20 OINTMENT TOPICAL at 08:55

## 2018-07-23 RX ADMIN — HEPARIN SODIUM 5000 UNIT(S): 5000 INJECTION INTRAVENOUS; SUBCUTANEOUS at 08:54

## 2018-07-23 RX ADMIN — MUPIROCIN 1 APPLICATION(S): 20 OINTMENT TOPICAL at 17:32

## 2018-07-23 NOTE — CONSULT NOTE ADULT - ASSESSMENT
89y old Female coming from [facility] in [facility], with hx of long standing h/o depression (recently weaned off desipramine by PCP), Narcolepsy , GERD, Urinary retention s/p metcalf, constipation, Iron deficiency, HTN, Hypothyroidism, dysphagia, left arm fracture (with deformity) 3rd admission to a hospital in last month ([facility] 3 weeks--> now ), admitted here on 7/19 for worsening lethargy.  Pt reportedly was seen at [facility] for weakness/dizziness 3 weeks prior to her  ED arrival and reportedly  all work-up was negative. Fund here to have +UA, chronic arm fracture, and MRI/A showing small subacute lacunar infarct. Palliative care consulted to assist with establishing GOC.     1) AMS  - likely multifactorial, with hx of depression (which can mimic dx of dementia), metabolic encephalopathy from UTI, and subacute lacunar infarct  - other studies including EEG, LP and MRIs at outside facilities negative (as per neurology notes)  - neurology notes appreciated  - supportive care  - fall and aspiration precautions  - may also consider psych consult given anti-depressive meds weaned before this all started happening    2) UTI  - on abx  - afebrile  - has metcalf    3) HAILEY  - CT abd negative  - improving  - c/w monitoring    4) L shoulder fracture  - ortho notes and imaging appreciated  - deemed to be chronic  - c/w conservative measures including lido patch and Tylenol prn    5) Debility  - pt with waning functional status over past month and already compromised at baseline  - PPS<40%  - dysphagia, speech and swallow eval appreciated  - malnutrition with albumin 2.4 and low po intake  - needs assistance for all ADLs  - to be reassessed by PT as per primary team    6) Prognosis  - guarded, likely overall poor  - in light of stroke, ppS<40%, malnutrition, need for assistance with all ADLs, pt would likely fit criteria for hospice if consistent with family's wishes; which they are not on board with now    7) GOC/Advanced Directives  - pt does not have capacity for decision making currently  - no HCP on file,  is surrogate decision maker: Luis Kennedyzler [phone]  - full code,  does not want to set any limits  - reviewed GOC today with , he declined family meeting with daughters involved. He is not ready to accept how much pt has declined, though seems to have some insight into this, knowing he needs at least private aids at home. He was open to discussion of all home care plans including hospice, clear that he is not ready to focus on pt's comfort and is not interested in discussing this further. Wants home care with [facility]. *Spent 30 minutes discussing GOC with family including Advance care planning, explanation and discussion of advance directives, reviewed all dispo options. See GOC note for further details.    *Given sx managed and GOC clear, will sign off. Discussed with Dr. Frederick and Case management team*  Thank you for including us in Mrs. Aparicio's care.     Nelly Marquez MD  Palliative Care Attending

## 2018-07-23 NOTE — CONSULT NOTE ADULT - SUBJECTIVE AND OBJECTIVE BOX
HPI: Mrs. Aparicio is a 89y old Female coming from [facility] in [facility], with hx of long standing h/o depression (recently weaned off desipramine by PCP), Narcolepsy , GERD, Urinary retention s/p metcalf, constipation, Iron deficiency, HTN, Hypothyroidism, dysphagia, left arm fracture (with deformity) 3rd admission to a hospital in last month ([facility] 3 weeks--> now ), admitted here on 7/19 for worsening lethargy.  Pt reportedly was seen at [facility] for weakness/dizziness 3 weeks prior to her  ED arrival and reportedly  all work-up was negative. Fund here to have +UA, chronic arm fracture, and MRI/A showing small subacute lacunar infarct. Palliative care consulted to assist with establishing GOC.     Met Mrs. Aparicio this am with  Luis at bedside. Pt awake and tracks, but not verbally responsive or following commands, appeared comfortable.      sharing all of history. He notes frustration about being to so many hospitals with lack of clarity on exactly what is causing pt's decline. He was open to talking about his understanding and GOC. See GOC note for additional details.     PAIN: ( )Yes   (x )No-no nonverbal signs of pain    DYSPNEA: ( ) Yes  (x ) No- no nonverbal signs of dyspnea  Level:    PAST MEDICAL & SURGICAL HISTORY:  Narcolepsy and cataplexy  Shoulder pain, left  Back pain  Hypothyroidism  Dysphagia  Urinary retention  HTN (hypertension)  Depression  No significant past surgical history      SOCIAL HX: Lives with , >60y, 2 daughters    Hx opiate tolerance ( )YES  (x )NO    Baseline ADLs  (Prior to Admission)- dependent for all ADLs, needing assistance for majority even before recent hospitalizations  ( ) Independent   (x )Dependent    FAMILY HISTORY:  Family history of stroke (Mother)      Review of Systems:  Unable to obtain/Limited due to: altered mentation      PHYSICAL EXAM:    Vital Signs Last 24 Hrs  T(C): 36.3 (23 Jul 2018 04:26), Max: 36.7 (22 Jul 2018 11:31)  T(F): 97.3 (23 Jul 2018 04:26), Max: 98.1 (22 Jul 2018 11:31)  HR: 87 (23 Jul 2018 04:26) (87 - 93)  BP: 166/76 (23 Jul 2018 04:26) (157/49 - 166/76)  BP(mean): --  RR: 18 (23 Jul 2018 04:26) (16 - 18)  SpO2: 99% (23 Jul 2018 04:26) (99% - 100%)  Daily     Daily     PPSV2:  20-30 %  FAST: no dementia    General: Elderly female lying on side in bed, stares but does not respond or follow commands  Mental Status: unable to gather  HEENT: mmm, perrl  Lungs: dec at bases, otherwise clear  Cardiac: +s1 s2 rrr  GI: soft nt nd +bs  : metcalf in place  Ext: bruising along extremities, with some chronic venous stasis changes  Neuro: limited as pt does not follow commands      LABS:    07-23    146<H>  |  113<H>  |  24<H>  ----------------------------<  119<H>  3.9   |  26  |  1.49<H>    Ca    9.4      23 Jul 2018 06:42        Albumin: Albumin, Serum: 2.4 g/dL (07-19 @ 14:24)      Allergies    No Known Allergies    Intolerances      MEDICATIONS  (STANDING):  acetaminophen   Tablet. 650 milliGRAM(s) Oral every 8 hours  aspirin  chewable 81 milliGRAM(s) Oral daily  atorvastatin 40 milliGRAM(s) Oral at bedtime  carvedilol 12.5 milliGRAM(s) Oral every 12 hours  cefTRIAXone Injectable. 1000 milliGRAM(s) IV Push every 24 hours  dextrose 5% + sodium chloride 0.9% with potassium chloride 20 mEq/L 1000 milliLiter(s) (50 mL/Hr) IV Continuous <Continuous>  dextrose 5% + sodium chloride 0.9%. 1000 milliLiter(s) (60 mL/Hr) IV Continuous <Continuous>  docusate sodium 100 milliGRAM(s) Oral three times a day  heparin  Injectable 5000 Unit(s) SubCutaneous every 12 hours  levothyroxine 25 MICROGram(s) Oral daily  lidocaine   Patch 2 Patch Transdermal daily  modafinil 100 milliGRAM(s) Oral daily  multivitamin 1 Tablet(s) Oral daily  mupirocin 2% Ointment 1 Application(s) Topical every 12 hours  pantoprazole    Tablet 40 milliGRAM(s) Oral before breakfast  polyethylene glycol 3350 17 Gram(s) Oral two times a day  senna 2 Tablet(s) Oral at bedtime    MEDICATIONS  (PRN):  aluminum hydroxide/magnesium hydroxide/simethicone Suspension 30 milliLiter(s) Oral every 4 hours PRN Dyspepsia  melatonin 5 milliGRAM(s) Oral at bedtime PRN Insomnia  ondansetron Injectable 4 milliGRAM(s) IV Push every 6 hours PRN Nausea      RADIOLOGY/ADDITIONAL STUDIES:      EXAM:  MR ANGIO NECK                        EXAM:  MR ANGIO BRAIN                        EXAM:  MR BRAIN                          PROCEDURE DATE:  07/20/2018      IMPRESSION:    BRAIN MRI:  Acute-subacute subcentimeter infarct within the left precentral gyrus. No   acute intracranial hemorrhage or midline shift.    Redemonstration of large left tympanomastoid cavity effusion.    Chronically lacunar infarct within the right cerebellum.    BRAIN MRA:  No significant stenosis, occlusion, or aneurysm.    NECK MRA:  Tortuosity of the mid-distal right ICA, with focal loss of flow related   enhancement of the mid right ICA. This finding may represent artifact   secondary to in-plane signal loss vs high-grade stenosis. Although, there   is normal caliber of the distal cervical ICA. If there is persistent   clinical concern a neck CTA can be performed for further characterization.    No significant stenosis or occlusion of the bilateral common and left   internal carotid and bilateral vertebral arteries.    Results discussed with , by radiologist Dr. Neil at 5:15 PM on   July 20, 2018.       MARY NEIL   This document has been electronically signed. Jul 20 2018  5:15PM        EXAM:  SHOULDER COMP  MIN 2 VIEWS-LT                        PROCEDURE DATE:  07/20/2018      No left glenohumeral or acromioclavicular joint dislocation.    Redemonstration of severe lateral displacement and overlapping mid left   humeral diaphyseal fracture, as described on left humeral/shoulder   radiographs of 7/19/18.      MARY NEIL   This document has been electronically signed. Jul 20 2018  3:05PM        EXAM:  CT ABDOMEN AND PELVIS                        EXAM:  CT CHEST                          PROCEDURE DATE:  07/19/2018      IMPRESSION:     No acute pathology. No pneumonia.      SCARLET BRANNON   This document has been electronically signed. Jul 19 2018  5:38PM
89y Female RHD presents to ortho team while admitted for lethargy and UTI. CXR found chronic L humeral shaft fx and dedicated humerus xrays confirmed. as per note pt has history of fx years ago that was treated no operatively due to age. pt is [place] but denies any new trauma. Denies HS/LOC. Denies numbness/tingling. Denies fever/chills. Denies pain/injury elsewhere. No other complaints.    ROS: all other ros neg other than noted above    PAST MEDICAL & SURGICAL HISTORY:  Narcolepsy and cataplexy  Shoulder pain, left  Back pain  Hypothyroidism  Dysphagia  Urinary retention  HTN (hypertension)  Depression  No significant past surgical history    Home Medications:  aspirin 81 mg oral tablet: 1 tab(s) orally once a day (19 Jul 2018 17:10)  bacitracin 500 units/g topical ointment: Apply topically to affected area 2 times a day to right inner lateral thigh skin tear (19 Jul 2018 17:10)  carvedilol 25 mg oral tablet: 1 tab(s) orally 2 times a day (19 Jul 2018 17:10)  Colace 100 mg oral capsule: 3 cap(s) orally once a day (at bedtime) (19 Jul 2018 17:10)  ferrous sulfate 325 mg (65 mg elemental iron) oral tablet: 1 tab(s) orally once a day (19 Jul 2018 17:10)  levothyroxine 25 mcg (0.025 mg) oral tablet: 1 tab(s) orally once a day (19 Jul 2018 17:10)  Lipitor 40 mg oral tablet: 1 tab(s) orally once a day (at bedtime) (19 Jul 2018 17:10)  losartan 100 mg oral tablet: 1 tab(s) orally once a day (19 Jul 2018 17:10)  modafinil 100 mg oral tablet: 1 tab(s) orally once a day (in the morning), As Needed (19 Jul 2018 17:10)  Multiple Vitamins oral tablet: 1 tab(s) orally once a day (19 Jul 2018 17:10)  NIFEdipine 30 mg oral tablet, extended release: 1 tab(s) orally once a day (19 Jul 2018 17:10)  Protonix 20 mg oral delayed release tablet: 1 tab(s) orally once a day (19 Jul 2018 17:10)      Allergies    No Known Allergies    Intolerances                          10.0   5.25  )-----------( 314      ( 20 Jul 2018 06:20 )             30.1     20 Jul 2018 06:20    144    |  112    |  37     ----------------------------<  119    3.7     |  23     |  1.54     Ca    9.0        20 Jul 2018 06:20  Phos  2.7       20 Jul 2018 06:20  Mg     2.1       20 Jul 2018 06:20    TPro  6.6    /  Alb  2.4    /  TBili  0.6    /  DBili  x      /  AST  16     /  ALT  38     /  AlkPhos  65     19 Jul 2018 14:24    PT/INR - ( 19 Jul 2018 14:24 )   PT: 13.5 sec;   INR: 1.24 ratio        PTT - ( 19 Jul 2018 14:24 )  PTT:40.3 sec  Vital Signs Last 24 Hrs  T(C): 36.7 (07-20-18 @ 17:06), Max: 36.7 (07-20-18 @ 17:06)  T(F): 98 (07-20-18 @ 17:06), Max: 98 (07-20-18 @ 17:06)  HR: 101 (07-20-18 @ 17:06) (85 - 101)  BP: 138/68 (07-20-18 @ 17:06) (138/68 - 156/77)  BP(mean): --  RR: 19 (07-20-18 @ 11:29) (17 - 19)  SpO2: 100% (07-20-18 @ 11:29) (100% - 100%)    Imaging: XR demonstrates chronic humeral shaft pseudoarthroses    Physical Exam  Gen: NAD  L UE: Skin intact, no skin break down, visible deformity to upper arm with palpable bone segments, able to range arm with deformity, mild ecchymosis over anterior upper arm, minimal ttp over arm shoulder, +r/u/m/ain/pin function, 5/5 WE,  SILT c5-t1, radial pulse intact, hand warm, compartments soft/compressible, warm/well perfused
Patient is a 89y old  Female who presents with a chief complaint of lethargy (19 Jul 2018 17:23)      HPI: (history obtained from her daughter over the telephone)  Ms. Aleman is a 89 year old woman with chronic depression. Her family began to notice a change 6 weeks ago when Desipramine was weaned off. She had been on this medication for many years for depression. She became lethargic and was admitted to [facility]. Throughout the course of her hospitalization she had fluctuating mental status. At times she was alert and at other times she was unresponsive. She had a workup including CT brain, EEG x 2, MRI brain and lumbar puncture. Modafinil was added to her medication regimen.   Her daughter is not aware of a previous history of Narcolepsy. She does not believe that she has cataplexy. She does not believed that they mentioned that she had a UTI at [place]. She was eventually discharged to [facility] where she continued to be lethargic so her daughter had her brought to [facility].  She was found to have a UTI upon admission and is now being treated with antibiotics.  Her daughter says that prior to 6 weeks ago she was "feeble" and walked with a walker but mostly sat in a chair or in bed during the day. However, she was cognitively intact. She is hard of hearing.   in ED T(F): 99.1 , Max: 99.1  HR: 86  (86 - 98) BP: 164/74 (129/80 - 167/72) RR: 18  (17 - 20) SpO2: 100%  (100% - 100%) on 4 L , EKG - sinus 95, NSST changes, troponin x 1 neg, TSH wnl, Cr 1.62, BUN 40 , Hb 10.9, alb 2.4, CK 25,   s/p 1 gm of ceftriaxone, CXR - ?LLL PNA, official reading is pending (19 Jul 2018 17:23)      PAST MEDICAL & SURGICAL HISTORY:  Narcolepsy and cataplexy - not verified by family  Shoulder pain, left  Back pain  Hypothyroidism  Dysphagia  Urinary retention  HTN (hypertension)  Depression  No significant past surgical history      FAMILY HISTORY:  Family history of stroke (Mother)      Social Hx:  Nonsmoker, no drug or alcohol use    MEDICATIONS  (STANDING):  acetaminophen   Tablet. 650 milliGRAM(s) Oral every 8 hours  aspirin  chewable 81 milliGRAM(s) Oral daily  atorvastatin 40 milliGRAM(s) Oral at bedtime  carvedilol 12.5 milliGRAM(s) Oral every 12 hours  cefTRIAXone Injectable. 1000 milliGRAM(s) IV Push every 24 hours  dextrose 5% + sodium chloride 0.9% with potassium chloride 20 mEq/L 1000 milliLiter(s) (50 mL/Hr) IV Continuous <Continuous>  docusate sodium 100 milliGRAM(s) Oral three times a day  heparin  Injectable 5000 Unit(s) SubCutaneous every 12 hours  levothyroxine 25 MICROGram(s) Oral daily  lidocaine   Patch 2 Patch Transdermal daily  modafinil 100 milliGRAM(s) Oral daily  multivitamin 1 Tablet(s) Oral daily  mupirocin 2% Ointment 1 Application(s) Topical every 12 hours  pantoprazole    Tablet 40 milliGRAM(s) Oral before breakfast  polyethylene glycol 3350 17 Gram(s) Oral daily  senna 2 Tablet(s) Oral at bedtime       Allergies    No Known Allergies    Intolerances        ROS: Pertinent positives in HPI, all other ROS were reviewed and are negative.      Vital Signs Last 24 Hrs  T(C): 36.3 (20 Jul 2018 11:29), Max: 37.3 (19 Jul 2018 17:22)  T(F): 97.3 (20 Jul 2018 11:29), Max: 99.1 (19 Jul 2018 17:22)  HR: 85 (20 Jul 2018 11:29) (85 - 99)  BP: 156/77 (20 Jul 2018 11:29) (107/49 - 167/72)  BP(mean): --  RR: 19 (20 Jul 2018 11:29) (17 - 20)  SpO2: 100% (20 Jul 2018 11:29) (100% - 100%)        Constitutional: awake and alert.  HEENT: PERRLA, EOMI,   Neck: Supple.  Respiratory: Breath sounds are clear bilaterally  Cardiovascular: S1 and S2, regular / irregular rhythm  Extremities:  no edema  Vascular: Caritid Bruit - no  Musculoskeletal: no joint swelling/tenderness, no abnormal movements  Skin: multiple abrasions    Neurological exam:  HF: Awake and alert. Able to answer some questions.   CN: PHILLIP, EOMI, VFF, facial sensation normal, no NLFD, tongue midline, Palate moves equally, SCM equal bilaterally, hearing impaired  Motor: + bradykinesia. Good strength in upper extremities. + weakness in lower extremities bilaterally.  Sens: Intact to light touch / PP/ VS/ JS    Reflexes: Symmetric, hypoactive, toes downgoing bilaterally  Coord:  no tremors  Gait/Balance: not tested    NIHSS:          Labs:   07-20    144  |  112<H>  |  37<H>  ----------------------------<  119<H>  3.7   |  23  |  1.54<H>    Ca    9.0      20 Jul 2018 06:20  Phos  2.7     07-20  Mg     2.1     07-20    TPro  6.6  /  Alb  2.4<L>  /  TBili  0.6  /  DBili  x   /  AST  16  /  ALT  38  /  AlkPhos  65  07-19                              10.0   5.25  )-----------( 314      ( 20 Jul 2018 06:20 )             30.1       Radiology:  CT brain 7/19/18: No acute intracranial hemorrhage, mass effect, or midline shift.    Mild bilateral cerebral white matter microvascular changes.    Nonspecific large left mastoid air cell effusion.
Patient is a 89y old  Female who presents with a chief complaint of lethargy (2018 17:23)    HPI:  89 y/o female with PMHx of  long standing h/o depression was weaned by PCP  of desipramine recently, Narcolepsy , GERD, Urinary retention s/p Metcalf , Constipation, Iron deficiency  HTN, Hypothyroidism, Dysphagia admitted on  from snf for evaluation of increasing lethargy; history per medical record as patient is unable to provide history.  at bedside unable to provide further information except that each time metcalf is removed it has to be replaced due to lack of her being able to urinate naturally.            PMH: as above  PSH: as above  Meds: per reconciliation sheet, noted below  MEDICATIONS  (STANDING):  acetaminophen   Tablet. 650 milliGRAM(s) Oral every 8 hours  aspirin  chewable 81 milliGRAM(s) Oral daily  atorvastatin 40 milliGRAM(s) Oral at bedtime  carvedilol 12.5 milliGRAM(s) Oral every 12 hours  cefTRIAXone Injectable. 1000 milliGRAM(s) IV Push every 24 hours  dextrose 5% + sodium chloride 0.9% with potassium chloride 20 mEq/L 1000 milliLiter(s) (50 mL/Hr) IV Continuous <Continuous>  docusate sodium 100 milliGRAM(s) Oral three times a day  heparin  Injectable 5000 Unit(s) SubCutaneous every 12 hours  levothyroxine 25 MICROGram(s) Oral daily  lidocaine   Patch 2 Patch Transdermal daily  modafinil 100 milliGRAM(s) Oral daily  multivitamin 1 Tablet(s) Oral daily  mupirocin 2% Ointment 1 Application(s) Topical every 12 hours  pantoprazole    Tablet 40 milliGRAM(s) Oral before breakfast  polyethylene glycol 3350 17 Gram(s) Oral two times a day  senna 2 Tablet(s) Oral at bedtime    MEDICATIONS  (PRN):  aluminum hydroxide/magnesium hydroxide/simethicone Suspension 30 milliLiter(s) Oral every 4 hours PRN Dyspepsia  ondansetron Injectable 4 milliGRAM(s) IV Push every 6 hours PRN Nausea    Allergies    No Known Allergies    Intolerances      Social: no smoking, no alcohol, no illegal drugs; no recent travel, no exposure to TB  FAMILY HISTORY:  Family history of stroke (Mother)     no history of premature cardiovascular disease in first degree relatives  ROS: unable to obtain secondary to patient medical condition     Vital Signs Last 24 Hrs  T(C): 36.3 (2018 11:29), Max: 37.3 (2018 17:22)  T(F): 97.3 (2018 11:29), Max: 99.1 (2018 17:22)  HR: 85 (2018 11:29) (85 - 99)  BP: 156/77 (2018 11:29) (107/49 - 164/74)  BP(mean): --  RR: 19 (2018 11:29) (17 - 19)  SpO2: 100% (2018 11:29) (100% - 100%)  Daily     Daily     PE:    Constitutional: frail looking  HEENT: NC/AT  Neck: supple; thyroid not palpable  Respiratory: respiratory effort normal; clear to auscultation  Cardiovascular: S1S2 regular, no murmurs  Abdomen: soft, not tender, not distended, positive BS; no liver or spleen organomegaly  Genitourinary: no suprapubic tenderness  Musculoskeletal: no muscle tenderness, no joint swelling  Neurological/ Psychiatric: lethargic  Skin: no rashes; no palpable lesions    Labs: all available labs reviewed                        10.0   5.25  )-----------( 314      ( 2018 06:20 )             30.1     07-20    144  |  112<H>  |  37<H>  ----------------------------<  119<H>  3.7   |  23  |  1.54<H>    Ca    9.0      2018 06:20  Phos  2.7     07-20  Mg     2.1     07-20    TPro  6.6  /  Alb  2.4<L>  /  TBili  0.6  /  DBili  x   /  AST  16  /  ALT  38  /  AlkPhos  65  07-19     LIVER FUNCTIONS - ( 2018 14:24 )  Alb: 2.4 g/dL / Pro: 6.6 gm/dL / ALK PHOS: 65 U/L / ALT: 38 U/L / AST: 16 U/L / GGT: x           Urinalysis Basic - ( 2018 13:32 )    Color: Yellow / Appearance: Slightly Turbid / S.010 / pH: x  Gluc: x / Ketone: Small  / Bili: Negative / Urobili: Negative mg/dL   Blood: x / Protein: 30 mg/dL / Nitrite: Positive   Leuk Esterase: Moderate / RBC: 6-10 /HPF / WBC >50   Sq Epi: x / Non Sq Epi: Few / Bacteria: Moderate      < from: CT Abdomen and Pelvis No Cont (18 @ 17:16) >    EXAM:  CT ABDOMEN AND PELVIS                          EXAM:  CT CHEST                            PROCEDURE DATE:  2018          INTERPRETATION:  CT CHEST, CT ABDOMEN AND PELVIS    HISTORY:  lethargy to r/o PNA    Technique: CT of the chest, abdomen, and pelvis is performed without oral   or intravenous contrast. Axial images are supplemented with coronal and   sagittal reformations. This study was performed using automatic exposure   control (radiation dose reduction software) to obtain adiagnostic image   quality scan with patient dose as low as reasonably achievable.    Contrast:     None    Comparison: None.    Findings:    LUNGS, AIRWAYS: The central airways are patent. The lungs are clear.   Small bibasilar nodules of uncertain chronicity.  PLEURA: No pleural abnormality.  HEART AND VESSELS: Normal heart size. No pericardial effusion. Coronary   artery calcifications are present. Normal caliber thoracic aorta.  MEDIASTINUM AND GUALBERTO: No adenopathy.  CHEST WALL: No masses.    LIVER: Normal.  SPLEEN: Normal.  PANCREAS: Normal.  GALLBLADDER/BILIARY TREE: Nondilated. Normal gallbladder.  ADRENALS: Normal.  KIDNEYS: No hydronephrosis or urinary tract calculi.  LYMPHADENOPATHY/RETROPERITONEUM: No adenopathy.  VASCULATURE: Aortoiliac atherosclerosis without aneurysm.  BOWEL: No bowel-related abnormality. Specifically, no bowel obstruction.  PELVIC VISCERA: No uterine or adnexal abnormality. Urinary bladder   collapsed around a Metcalf catheter balloon.  PELVIC LYMPH NODES: No pelvic adenopathy.  PERITONEUM/ABDOMINAL WALL: No free air or ascites. Moderate   fat-containing umbilical hernia.  SKELETAL: No acute bony abnormality.    IMPRESSION:     No acute pathology. No pneumonia.        < end of copied text >            Radiology: all available radiological tests reviewed    Advanced directives addressed: full resuscitation
Patient is a 89y old  Female who presents with a chief complaint of lethargy.      HPI:  89 y/o female with PMHx of  long standing h/o depression was weaned by PCP  of desipramine recently, Narcolepsy , GERD, Urinary retention s/p Dill , Constipation, Iron deficiency  HTN, Hypothyroidism, Dysphagia  presents to the   from [facility]   with worsening of lethargy starting 3 weeks ago. Pt was [facility] at [facility] for weakness/dizziness 3 weeks ago, all work-up was negative .  As per daughter per history at the time of admission,   pt  was in and out of consciousness and unresponsive during hospitalization. MRI and other tests were all normal.  Patient is nonambulatory  recently , has h/o left arm fracture with deformity.  in ED T(F): 99.1 , Max: 99.1  HR: 86  (86 - 98) BP: 164/74 (129/80 - 167/72) RR: 18  (17 - 20) SpO2: 100%  (100% - 100%) on 4 L , troponin x 1 neg, TSH wnl, Cr 1.62, BUN 40 , Hb 10.9, alb 2.4, CK 25,   s/p 1 gm of ceftriaxone, CXR - ?LLL PNA, official reading is pending    -Pt seen and examined this am.  She denies any CP or SOB.        PAST MEDICAL & SURGICAL HISTORY:  Narcolepsy and cataplexy  Shoulder pain, left  Back pain  Hypothyroidism  Dysphagia  Urinary retention  HTN (hypertension)  Depression  No significant past surgical history      MEDICATIONS  (STANDING):  acetaminophen   Tablet. 650 milliGRAM(s) Oral every 8 hours  aspirin  chewable 81 milliGRAM(s) Oral daily  atorvastatin 40 milliGRAM(s) Oral at bedtime  carvedilol 12.5 milliGRAM(s) Oral every 12 hours  cefTRIAXone Injectable. 1000 milliGRAM(s) IV Push every 24 hours  dextrose 5% + sodium chloride 0.9% with potassium chloride 20 mEq/L 1000 milliLiter(s) (50 mL/Hr) IV Continuous <Continuous>  docusate sodium 100 milliGRAM(s) Oral three times a day  heparin  Injectable 5000 Unit(s) SubCutaneous every 12 hours  levothyroxine 25 MICROGram(s) Oral daily  lidocaine   Patch 2 Patch Transdermal daily  modafinil 100 milliGRAM(s) Oral daily  multivitamin 1 Tablet(s) Oral daily  mupirocin 2% Ointment 1 Application(s) Topical every 12 hours  pantoprazole    Tablet 40 milliGRAM(s) Oral before breakfast  polyethylene glycol 3350 17 Gram(s) Oral daily  senna 2 Tablet(s) Oral at bedtime    MEDICATIONS  (PRN):  aluminum hydroxide/magnesium hydroxide/simethicone Suspension 30 milliLiter(s) Oral every 4 hours PRN Dyspepsia  ondansetron Injectable 4 milliGRAM(s) IV Push every 6 hours PRN Nausea      FAMILY HISTORY:  Family history of stroke (Mother)      SOCIAL HISTORY:  unable to obtain from pt    REVIEW OF SYSTEMS:  CONSTITUTIONAL:    c/o altered conciousness  HEENT:  Eyes:  No visual changes.     ENT:  No epistaxis.  No sinus pain.    RESPIRATORY:  No cough.  No wheeze.  No hemoptysis.  No shortness of breath.  CARDIOVASCULAR:  No chest pains.  No palpitations. No shortness of breath, No orthopnea or PND.  GASTROINTESTINAL:  No abdominal pain.  No nausea or vomiting.    GENITOURINARY:    No hematuria.    MUSCULOSKELETAL:  No musculoskeletal pain.  No joint swelling.  No arthritis.  NEUROLOGICAL:  No tingling or numbness or weakness.  PSYCHIATRIC:  No confusion  SKIN:  ecchymoses  ENDOCRINE:  No unexplained weight loss.  No polydipsia.   HEMATOLOGIC:  No anemia.  No prolonged or excessive bleeding.   ALLERGIC AND IMMUNOLOGIC:  No pruritus.          Vital Signs Last 24 Hrs  T(C): 36.6 (2018 05:01), Max: 37.3 (2018 17:22)  T(F): 97.9 (2018 05:01), Max: 99.1 (2018 17:22)  HR: 91 (2018 05:01) (86 - 99)  BP: 148/65 (2018 05:01) (107/49 - 167/72)  BP(mean): --  RR: 17 (2018 05:01) (17 - 20)  SpO2: 100% (2018 05:01) (100% - 100%)    PHYSICAL EXAM-    Constitutional: elderly female in no acute distress    Head: Head is normocephalic and atraumatic.      Neck: No jugular venous distention. No audible carotid bruits.     Cardiovascular: Regular rate and rhythm without S3, S4. No murmurs or rubs are appreciated.      Respiratory: Breath sounds are normal. No rales. No wheezing.    Abdomen: Soft, nontender, nondistended with positive bowel sounds.      Extremity: No tenderness. No  pitting edema     Neurologic: The patient is alert, was able to answer few questions.     Skin: multiple sites of ecchymoses and skin tears.     Psychiatric: The patient appears to be emotionally stable.      INTERPRETATION OF TELEMETRY: sinus rythm    ECG: Sinus rythm , non specific ST T changes.  normal QT interval    I&O's Detail      LABS:                        10.0   5.25  )-----------( 314      ( 2018 06:20 )             30.1         143  |  110<H>  |  40<H>  ----------------------------<  102<H>  3.7   |  22  |  1.62<H>    Ca    9.1      2018 14:24  Mg     2.0         TPro  6.6  /  Alb  2.4<L>  /  TBili  0.6  /  DBili  x   /  AST  16  /  ALT  38  /  AlkPhos  65      CARDIAC MARKERS ( 2018 22:04 )  <0.015 ng/mL / x     / 38 U/L / x     / x      CARDIAC MARKERS ( 2018 14:24 )  <0.015 ng/mL / x     / 25 U/L / x     / x          PT/INR - ( 2018 14:24 )   PT: 13.5 sec;   INR: 1.24 ratio         PTT - ( 2018 14:24 )  PTT:40.3 sec  Urinalysis Basic - ( 2018 13:32 )    Color: Yellow / Appearance: Slightly Turbid / S.010 / pH: x  Gluc: x / Ketone: Small  / Bili: Negative / Urobili: Negative mg/dL   Blood: x / Protein: 30 mg/dL / Nitrite: Positive   Leuk Esterase: Moderate / RBC: 6-10 /HPF / WBC >50   Sq Epi: x / Non Sq Epi: Few / Bacteria: Moderate      I&O's Summary    BNPSerum Pro-Brain Natriuretic Peptide: 583 pg/mL ( @ 14:24)    RADIOLOGY & ADDITIONAL STUDIES:  < from: Xray Chest 1 View AP/PA. (18 @ 14:18) >  EXAM:  XR CHEST AP OR PA 1V                            PROCEDURE DATE:  2018          INTERPRETATION:  XR CHEST AP OR PA 1V    Single AP view    HISTORY:  Altered mental status    Comparison:  Chest x-ray 2007    The cardiac silhouette is within normal limits. The lungs are clear. No   pleural abnormality. Chronic left humeral deformity.    IMPRESSION: No acute disease.                      SCARLET BRANNON   This document has been electronically signed. 2018  3:26PM    < end of copied text >

## 2018-07-23 NOTE — GOALS OF CARE CONVERSATION - PERSONAL ADVANCE DIRECTIVE - CONVERSATION DETAILS
Met with  to discuss medical issues and overall GOC. He explained that prior to hospitalizations pt was living at home with him, no help at home. He explained that pt was doing ok, able to feed herself, but needed help with bathing and other ADLs. He says they have been  for >60y, they know each other best, and have always been loners not wanting other people in their home. He endorses having 2 daughters that help him keep medical issues in order, but that they are getting "pushy" wanting to make decisions, but he is adamant that he is the decision maker. When offered meeting to help get everyone on the same page he declined, which we noted intent to respect.      noted frustration about being at 3 different hospitals without being sure of what was making pt so debilitated. He shared all of the workup that was done thus far, able to note UTI and new finding of subacute stroke. We discussed how these things, inclusive of depression and recently changed meds could manifest in what we are seeing now. Shared his concern about how things have progressed and where they are headed. When asked, he noted that what was most important to him was getting metcalf issues sorted, pt eating more, and taking pt home. Inquired if he thought about what he would want if those goals were not obtainable and he was unsure. He was sure that he wanted pt to be full code, saying he knew her wishes, though seeming to simply not be prepared to consider losing his wife. Spent time reassuring him that he had a right to his decisions on her behalf and that staff would respect them, but had to ask as well as let him know about our concerns and recommendations which he understood. He also noted that he wanted pt to come home with [facility] home care, though he knew that nursing service would not be sufficient for her current needs. He was open to hearing about all levels of care including hospice. However, pt was clear that his impression of hospice and confirmed that he was not ready for this yet, as he intends to continue to bring pt to hospital if new issues arise after discharge. He was appreciative of having the information if he wanted to change his mind (which it seems he will not) in the future.     Given his clarity on his hopes, plan, and that he was not interested in further conversation (especially not thinking his daughters needed to be included, saying they work and that he is the decision maker anyway), we shared our contact information should he change his mind. Discussed above with Dr. Frederick and Case management team who agree pt will likely continue to decline, but also acknowledge  is not ready to fully face this likely reality.

## 2018-07-23 NOTE — GOALS OF CARE CONVERSATION - PERSONAL ADVANCE DIRECTIVE - WHAT MATTERS MOST
Pt improving and coming home.  has some insight into her decline, but seems somewhat overwhelmed by this. He explains he is the kind of person that needs concrete answers, agreeing this can be frustrating in face of some not-so-clear issues.

## 2018-07-23 NOTE — CHART NOTE - NSCHARTNOTEFT_GEN_A_CORE
Palliative Medicine Discharge Summary    **This is a summary of the conversations and decisions you have made with the support of the Palliative Care Team**    Family Meeting Held: 7/23/18  Advance Directives:  -Surrogate Decision Maker- Luis Kennedyzler  -Code Status: Attempt full resuscitation and intubation    Goals of Care/Preferences for Treatment:  We met together with your family to talk about your diagnosis and how this will effect your life and what is important to you. Your  shared all that you have been through to get to this point. He also shared understanding of your current diagnoses of urinary tract infection and subacute stroke/infarct. We discussed how these things can present and also lead to decline in your mental status and functional ability. We shared his frustrations with this and importance of being clear about what is important for your care moving forward. We discussed all possible plans including big decisions like what to do if your heart stopped (your  was clear that he would want trial of resuscitation and intubation, which we noted intent to honor); and also all levels of home care for when you were ready for discharge. Your  shared his desire for home [facility] with [facility]), open to discussion of all levels of care with this agency including baseline home care (current choice), home palliative care (a nurse trained in symptom management to check in 1x/week), and full support when ready to focus on comfort at home via hospice. We agreed that at this point, your family is ready for baseline home care with [facility] only and supported decision for privately hiring aids to supplement your home care given your increased care needs. We shared our information with your  should he want to discuss this further in the future or for help including the rest of your family, understanding that he is clear about his wishes currently. We continue to hope with you for the best.     Discharge Plan:    Plan is to be discharged when medically stable. You can expect that you will continue to have the best care and respect of your right to dignity and to be without suffering.     It has been a pleasure to get to know you and your family. We wish you all the best.    Nelly Marqeuz MD  Palliative Care Attending
Upon Nutritional Assessment by the Registered Dietitian your patient was determined to meet criteria / has evidence of the following diagnosis/diagnoses:          [ ]  Mild Protein Calorie Malnutrition        [ ]  Moderate Protein Calorie Malnutrition        [x ] Severe Protein Calorie Malnutrition        [ ] Unspecified Protein Calorie Malnutrition        [ ] Underweight / BMI <19        [ ] Morbid Obesity / BMI > 40      Findings as based on:  •  Comprehensive nutrition assessment and consultation  •  Calorie counts (nutrient intake analysis)  •  Food acceptance and intake status from observations by staff  •  Follow up  •  Patient education  •  Intervention secondary to interdisciplinary rounds  •   concerns    Pt meets criteria for severe protein-calorie malnutrition in context of acute condition, etiology unknown.    NFPE reveals moderate generalized muscle wasting  Moderate generalized fat depletion   seen mostly on observation as pt's  asked that pt not be disturbed further.    Pt's  reports that pt has lost 3.75% UBW in one month   PO intake < 50% nutritional needs > one month.      Treatment:    The following diet has been recommended:  Suggest maintain current pureed diet,   suggest re-test to add more palatable food for pt.    Suggest change liquids to thin liquids from NT liquids, as per SLP.   Add Ensure enlive 8 oz tid   Add Ensure pudding TID.   Add applesauce to each meal.    Weekly weights  Record PO intake in EMR after each meal.   Pt requires feeding assistance due to occasional confusion      PROVIDER Section:     By signing this assessment you are acknowledging and agree with the diagnosis/diagnoses assigned by the Registered Dietitian    Comments:

## 2018-07-23 NOTE — GOALS OF CARE CONVERSATION - PERSONAL ADVANCE DIRECTIVE - TREATMENT GUIDELINE COMMENT
No limits set, continue with current interventions.     Give GoC clear, will sign off. Please recall if family or pt need further support, as we remain available should they change their mind.

## 2018-07-23 NOTE — CDI QUERY NOTE - NSCDIOTHERTXTBX_GEN_ALL_CORE_HH
89 y/o female with PMHx of /o Urinary retention s/p Dill  c/o AMS and lethargy starting 3 weeks ago. Patient diagnosed with UTI  with metabolic encephalopathy. Pt on Ceftriaxone      Please clarify the link between UTI and Dill if applicable below    A) UTI  likely due to  chronic Dill  B) UTI  unlikely due to chronic Dill  C) Other  please clarify  D) Clinically insignificant

## 2018-07-24 RX ORDER — MORPHINE SULFATE 50 MG/1
1 CAPSULE, EXTENDED RELEASE ORAL EVERY 6 HOURS
Qty: 0 | Refills: 0 | Status: DISCONTINUED | OUTPATIENT
Start: 2018-07-24 | End: 2018-07-25

## 2018-07-24 RX ADMIN — Medication 650 MILLIGRAM(S): at 21:08

## 2018-07-24 RX ADMIN — MUPIROCIN 1 APPLICATION(S): 20 OINTMENT TOPICAL at 16:46

## 2018-07-24 RX ADMIN — CEFTRIAXONE 1000 MILLIGRAM(S): 500 INJECTION, POWDER, FOR SOLUTION INTRAMUSCULAR; INTRAVENOUS at 12:00

## 2018-07-24 RX ADMIN — ATORVASTATIN CALCIUM 40 MILLIGRAM(S): 80 TABLET, FILM COATED ORAL at 21:08

## 2018-07-24 RX ADMIN — LIDOCAINE 2 PATCH: 4 CREAM TOPICAL at 12:13

## 2018-07-24 RX ADMIN — SENNA PLUS 2 TABLET(S): 8.6 TABLET ORAL at 21:08

## 2018-07-24 RX ADMIN — MUPIROCIN 1 APPLICATION(S): 20 OINTMENT TOPICAL at 05:36

## 2018-07-24 RX ADMIN — MORPHINE SULFATE 1 MILLIGRAM(S): 50 CAPSULE, EXTENDED RELEASE ORAL at 17:20

## 2018-07-24 RX ADMIN — MORPHINE SULFATE 1 MILLIGRAM(S): 50 CAPSULE, EXTENDED RELEASE ORAL at 16:44

## 2018-07-24 RX ADMIN — Medication 100 MILLIGRAM(S): at 21:08

## 2018-07-25 LAB
ANION GAP SERPL CALC-SCNC: 7 MMOL/L — SIGNIFICANT CHANGE UP (ref 5–17)
BUN SERPL-MCNC: 20 MG/DL — SIGNIFICANT CHANGE UP (ref 7–23)
CALCIUM SERPL-MCNC: 8.7 MG/DL — SIGNIFICANT CHANGE UP (ref 8.5–10.1)
CHLORIDE SERPL-SCNC: 115 MMOL/L — HIGH (ref 96–108)
CO2 SERPL-SCNC: 26 MMOL/L — SIGNIFICANT CHANGE UP (ref 22–31)
CREAT SERPL-MCNC: 1.36 MG/DL — HIGH (ref 0.5–1.3)
CULTURE RESULTS: SIGNIFICANT CHANGE UP
CULTURE RESULTS: SIGNIFICANT CHANGE UP
GLUCOSE SERPL-MCNC: 131 MG/DL — HIGH (ref 70–99)
POTASSIUM SERPL-MCNC: 3.5 MMOL/L — SIGNIFICANT CHANGE UP (ref 3.5–5.3)
POTASSIUM SERPL-SCNC: 3.5 MMOL/L — SIGNIFICANT CHANGE UP (ref 3.5–5.3)
SODIUM SERPL-SCNC: 148 MMOL/L — HIGH (ref 135–145)
SPECIMEN SOURCE: SIGNIFICANT CHANGE UP
SPECIMEN SOURCE: SIGNIFICANT CHANGE UP

## 2018-07-25 RX ORDER — CARVEDILOL PHOSPHATE 80 MG/1
1 CAPSULE, EXTENDED RELEASE ORAL
Qty: 0 | Refills: 0 | COMMUNITY

## 2018-07-25 RX ORDER — LANOLIN ALCOHOL/MO/W.PET/CERES
1 CREAM (GRAM) TOPICAL
Qty: 0 | Refills: 0 | COMMUNITY
Start: 2018-07-25

## 2018-07-25 RX ORDER — CARVEDILOL PHOSPHATE 80 MG/1
1 CAPSULE, EXTENDED RELEASE ORAL
Qty: 0 | Refills: 0 | COMMUNITY
Start: 2018-07-25

## 2018-07-25 RX ORDER — POLYETHYLENE GLYCOL 3350 17 G/17G
17 POWDER, FOR SOLUTION ORAL
Qty: 0 | Refills: 0 | COMMUNITY
Start: 2018-07-25

## 2018-07-25 RX ORDER — ACETAMINOPHEN 500 MG
2 TABLET ORAL
Qty: 0 | Refills: 0 | COMMUNITY
Start: 2018-07-25

## 2018-07-25 RX ORDER — DOCUSATE SODIUM 100 MG
3 CAPSULE ORAL
Qty: 0 | Refills: 0 | COMMUNITY

## 2018-07-25 RX ORDER — MODAFINIL 200 MG/1
1 TABLET ORAL
Qty: 0 | Refills: 0 | COMMUNITY

## 2018-07-25 RX ORDER — FERROUS SULFATE 325(65) MG
1 TABLET ORAL
Qty: 0 | Refills: 0 | COMMUNITY

## 2018-07-25 RX ORDER — ATORVASTATIN CALCIUM 80 MG/1
1 TABLET, FILM COATED ORAL
Qty: 0 | Refills: 0 | COMMUNITY

## 2018-07-25 RX ORDER — MODAFINIL 200 MG/1
1 TABLET ORAL
Qty: 0 | Refills: 0 | COMMUNITY
Start: 2018-07-25

## 2018-07-25 RX ORDER — NIFEDIPINE 30 MG
1 TABLET, EXTENDED RELEASE 24 HR ORAL
Qty: 0 | Refills: 0 | COMMUNITY

## 2018-07-25 RX ORDER — LOSARTAN POTASSIUM 100 MG/1
1 TABLET, FILM COATED ORAL
Qty: 0 | Refills: 0 | COMMUNITY

## 2018-07-25 RX ORDER — MUPIROCIN 20 MG/G
1 OINTMENT TOPICAL
Qty: 0 | Refills: 0 | COMMUNITY
Start: 2018-07-25

## 2018-07-25 RX ORDER — MORPHINE SULFATE 50 MG/1
2 CAPSULE, EXTENDED RELEASE ORAL EVERY 6 HOURS
Qty: 0 | Refills: 0 | Status: DISCONTINUED | OUTPATIENT
Start: 2018-07-25 | End: 2018-07-26

## 2018-07-25 RX ORDER — ASPIRIN/CALCIUM CARB/MAGNESIUM 324 MG
1 TABLET ORAL
Qty: 0 | Refills: 0 | COMMUNITY

## 2018-07-25 RX ORDER — MORPHINE SULFATE 50 MG/1
1 CAPSULE, EXTENDED RELEASE ORAL
Qty: 0 | Refills: 0 | COMMUNITY
Start: 2018-07-25

## 2018-07-25 RX ORDER — DOCUSATE SODIUM 100 MG
1 CAPSULE ORAL
Qty: 0 | Refills: 0 | COMMUNITY
Start: 2018-07-25

## 2018-07-25 RX ORDER — BACITRACIN ZINC 500 UNIT/G
1 OINTMENT IN PACKET (EA) TOPICAL
Qty: 0 | Refills: 0 | COMMUNITY

## 2018-07-25 RX ADMIN — SENNA PLUS 2 TABLET(S): 8.6 TABLET ORAL at 21:18

## 2018-07-25 RX ADMIN — PANTOPRAZOLE SODIUM 40 MILLIGRAM(S): 20 TABLET, DELAYED RELEASE ORAL at 05:27

## 2018-07-25 RX ADMIN — LIDOCAINE 2 PATCH: 4 CREAM TOPICAL at 05:28

## 2018-07-25 RX ADMIN — MORPHINE SULFATE 2 MILLIGRAM(S): 50 CAPSULE, EXTENDED RELEASE ORAL at 18:35

## 2018-07-25 RX ADMIN — LIDOCAINE 2 PATCH: 4 CREAM TOPICAL at 13:16

## 2018-07-25 RX ADMIN — MUPIROCIN 1 APPLICATION(S): 20 OINTMENT TOPICAL at 17:27

## 2018-07-25 RX ADMIN — HEPARIN SODIUM 5000 UNIT(S): 5000 INJECTION INTRAVENOUS; SUBCUTANEOUS at 05:27

## 2018-07-25 RX ADMIN — Medication 650 MILLIGRAM(S): at 05:26

## 2018-07-25 RX ADMIN — Medication 25 MICROGRAM(S): at 05:26

## 2018-07-25 RX ADMIN — MUPIROCIN 1 APPLICATION(S): 20 OINTMENT TOPICAL at 05:28

## 2018-07-25 RX ADMIN — MORPHINE SULFATE 2 MILLIGRAM(S): 50 CAPSULE, EXTENDED RELEASE ORAL at 18:36

## 2018-07-25 RX ADMIN — CARVEDILOL PHOSPHATE 12.5 MILLIGRAM(S): 80 CAPSULE, EXTENDED RELEASE ORAL at 05:26

## 2018-07-25 RX ADMIN — CARVEDILOL PHOSPHATE 12.5 MILLIGRAM(S): 80 CAPSULE, EXTENDED RELEASE ORAL at 17:26

## 2018-07-25 RX ADMIN — HEPARIN SODIUM 5000 UNIT(S): 5000 INJECTION INTRAVENOUS; SUBCUTANEOUS at 17:26

## 2018-07-25 RX ADMIN — POLYETHYLENE GLYCOL 3350 17 GRAM(S): 17 POWDER, FOR SOLUTION ORAL at 05:27

## 2018-07-25 NOTE — DISCHARGE NOTE ADULT - PATIENT PORTAL LINK FT
You can access the [facility], offered by [facility], by registering with the following website: [email or web address]

## 2018-07-25 NOTE — DISCHARGE NOTE ADULT - MEDICATION SUMMARY - MEDICATIONS TO TAKE
I will START or STAY ON the medications listed below when I get home from the hospital:    acetaminophen 325 mg oral tablet  -- 2 tab(s) by mouth every 8 hours  -- Indication: For Mild pain    morphine  -- 1 milligram(s) intravenous every 4 hours, As Needed pain  -- Indication: For pain    aluminum hydroxide-magnesium hydroxide 200 mg-200 mg/5 mL oral suspension  -- 30 milliliter(s) by mouth every 4 hours, As needed, Dyspepsia  -- Indication: For gerd    carvedilol 12.5 mg oral tablet  -- 1 tab(s) by mouth every 12 hours  -- Indication: For Hypertension    modafinil 100 mg oral tablet  -- 1 tab(s) by mouth once a day  -- Indication: For Narcolepsy and cataplexy    mupirocin 2% topical ointment  -- 1 application on skin every 12 hours  -- Indication: For Skin care    docusate sodium 100 mg oral capsule  -- 1 cap(s) by mouth 3 times a day  -- Indication: For constipation    polyethylene glycol 3350 oral powder for reconstitution  -- 17 gram(s) by mouth 2 times a day  -- Indication: For constipation    melatonin 5 mg oral tablet  -- 1 tab(s) by mouth once a day (at bedtime), As needed, Insomnia  -- Indication: For Sleep    Protonix 20 mg oral delayed release tablet  -- 1 tab(s) by mouth once a day  -- Indication: For gerd    levothyroxine 25 mcg (0.025 mg) oral tablet  -- 1 tab(s) by mouth once a day  -- Indication: For Hypothyroidism I will START or STAY ON the medications listed below when I get home from the hospital:    acetaminophen 325 mg oral tablet  -- 2 tab(s) by mouth every 8 hours  -- Indication: For Mild pain    morphine  -- 1 milligram(s) intravenous every 6 hours  -- Indication: For Pain    aluminum hydroxide-magnesium hydroxide 200 mg-200 mg/5 mL oral suspension  -- 30 milliliter(s) by mouth every 4 hours, As needed, Dyspepsia  -- Indication: For gerd    carvedilol 12.5 mg oral tablet  -- 1 tab(s) by mouth every 12 hours  -- Indication: For Hypertension    modafinil 100 mg oral tablet  -- 1 tab(s) by mouth once a day  -- Indication: For Narcolepsy and cataplexy    mupirocin 2% topical ointment  -- 1 application on skin every 12 hours  -- Indication: For Skin care    docusate sodium 100 mg oral capsule  -- 1 cap(s) by mouth 3 times a day  -- Indication: For constipation    polyethylene glycol 3350 oral powder for reconstitution  -- 17 gram(s) by mouth 2 times a day  -- Indication: For constipation    melatonin 5 mg oral tablet  -- 1 tab(s) by mouth once a day (at bedtime), As needed, Insomnia  -- Indication: For Sleep    Protonix 20 mg oral delayed release tablet  -- 1 tab(s) by mouth once a day  -- Indication: For gerd    levothyroxine 25 mcg (0.025 mg) oral tablet  -- 1 tab(s) by mouth once a day  -- Indication: For Hypothyroidism

## 2018-07-25 NOTE — PROGRESS NOTE ADULT - ASSESSMENT
89 y/o female with PMHx of  long standing h/o depression was weaned by PCP  of desipramine recently, Narcolepsy , GERD, Urinary retention s/p Dill,  Constipation, Iron deficiency  HTN, Hypothyroidism, Dysphagia  presents to the  with worsening lethargy.     1. Abnormal EKG- her EKG at the time of admission showed only non specific ST T changes.  No anginal symptoms.  Cardiac enzymes did not reveal any ischemia.  I do not think this EKG changes might have caused her altered mental status.  Evaluate for other etiolgies.  Obtain records from her last hospitalization including EKG.  2Decho report above- Nl LV fxn.  Conservative medical mgmt for now.     2. HTN- continue coreg.    3. Hyperlipidemia- continue statin.    4. Metabolic encephalopathy due to UTI vs CVA vs worsening of dementia- mgmt as per primary team.  MRI- small acute/subacute lacunar infarct. Conservative medical mgmt with asa, statin. Neuro following.     5. UTI- cont abx as per primary team. Urine cx pending.     6. DVT proph, replete lytes as needed.     7. Outpt f/u upon discharge.
Assessment and Recommendation:   · Assessment		  89 year old woman with chronic depression with subacute presentation of encephalopathy with reported negative workup at [place].  As per the patient's daughter, there is no history of dementia.  Likely a component of toxic encephalopathy in the setting of UTI. However, it is unclear when this problem began. Presumably this was checked during her previous outside hospitalization.  Agree with repeat MRI brain which was + ve for Subacute / acute infarct left pre central gyrus could explain her speech/MS changes.  Speech swallow consult  ASA with statin  Physical therapy.  F/u By On call neurology.
Metabolic encephalopathy due to UTI vs CVA vs worsening of dementia   - UA reviewed (+); ucx pending  - MRI with small acute/subacute lacunar infarct  - TSH wnl  - appreciate neuro input  - f/u TTE results  - c/w ASA, statins  - Continue PT  - con't rocephin     HIALEY d/t UTI  -  h/o Urinary retention s/p Dill (initiated at last OSH encounter then continued at discharge to rehab)  - slight improvement in crt  - con't IV fluids - gentle   - CT abd devoid of obstruction     Dyspnea    - CT chest negative for PNA and PE  - BLE US negative for DVT    Anemia chronic diseases  - iron studies reviewed  - con't oral supplemement  - bowel regimen as primary prevention     Narcolepsy   - modafinil 100 standing     HTN  - hold ARBs due to HAILEY  - hold nifedipine due to leg edema   - c/w coreg 25 bid  - monitor    GERD  - c/w PPI    Dysphagia  - appreciate SLP input-- puree diet  - nutrition consult ordered on adm    Skin tears, pressure ulcers  - local wound care    L should fracture - chronic vs acute on chronic  as per pt's dtr, pt had L shoulder fx 5 yrs ago; non-invasive management with sling was done, as per dtr she was told that pt was too old for surgery as risk were high; shoulder healed in a deformed manner  - pt very guarded w/ shoulder  * ? acute fx of 10th rib on shoulder xray  - ortho consult appreciated  - lidoderm patch ordered on adm    Constipation  -  miralax / colace    Advanced directives -   - goals of care d/w with family / pt --> full code    DVT proph - heparin
89 y/o female with PMHx of  long standing h/o depression was weaned by PCP  of desipramine recently, Narcolepsy , GERD, Urinary retention s/p Dill,  Constipation, Iron deficiency  HTN, Hypothyroidism, Dysphagia  presents to the  with worsening lethargy.     1. Abnormal EKG- her EKG at the time of admission showed only non specific ST T changes.  No anginal symptoms.  Cardiac enzymes did not reveal any ischemia.  I do not think this EKG changes might have caused her altered mental status.  Evaluate for other etiolgies. Obtain records from her last hospitalization including EKG.  2Decho report above- Nl LV fxn.  Conservative medical mgmt for now. Can f/u as outpt.    2. HTN- continue coreg. Labile- cont to follow.    3. Hyperlipidemia- continue statin.    4. Metabolic encephalopathy due to UTI vs CVA vs worsening of dementia- mgmt as per primary team.  MRI- small acute/subacute lacunar infarct. Conservative medical mgmt with asa, statin. Neuro following.     5. UTI- cont abx as per primary team. Urine cx pending.     6. DVT proph, replete lytes as needed.     7. Palliative care now following.
89 y/o female with PMHx of  long standing h/o depression was weaned by PCP  of desipramine recently, Narcolepsy , GERD, Urinary retention s/p Metcalf , Constipation, Iron deficiency  HTN, Hypothyroidism, Dysphagia admitted on 7/19 from snf for evaluation of increasing lethargy; history per medical record as patient is unable to provide history.  at bedside unable to provide further information except that each time metcalf is removed it has to be replaced due to lack of her being able to urinate naturally.  1. Patient admitted with lethargy, found to have pyuria, possibly urinary tract infection, has chronic metcalf  - follow up cultures   - reviewed prior medical records to evaluate for resistant or atypical pathogens   - serial cbc and monitor temperature   - iv hydration and supportive care   - completed course of ceftriaxone   - transition to hospice  2. other issues: depression was weaned by PCP  of desipramine recently, Narcolepsy , GERD, Urinary retention s/p Metcalf , Constipation, Iron deficiency  HTN, Hypothyroidism, Dysphagia   - per medicine
Metabolic encephalopathy due to UTI vs CVA vs worsening of dementia   - UA reviewed (+); ucx never done  Suspected UTI due to metcalf catheter  - MRI with small acute/subacute lacunar infarct  - TSH wnl  - appreciate neuro input  - c/w ASA, statins  - Continue PT  - D/D rocephin after today's dose     HAILEY d/t UTI  -  h/o Urinary retention s/p Metcalf (initiated at last OSH encounter then continued at discharge to rehab)  - Restarted  IV fluids - gentle   - CT abd devoid of obstruction     Dyspnea    - CT chest negative for PNA and PE  - BLE US negative for DVT    Anemia chronic diseases  - iron studies reviewed  - con't oral supplemement  - bowel regimen as primary prevention     Narcolepsy   - modafinil 100 standing     HTN  - hold ARBs due to HAILEY  - hold nifedipine due to leg edema   - c/w coreg 25 bid  - monitor    GERD  - c/w PPI    Dysphagia  - appreciate SLP input-- puree diet    Skin tears, pressure ulcers  - local wound care    L should fracture - chronic vs acute on chronic  as per pt's dtr, pt had L shoulder fx 5 yrs ago; non-invasive management with sling was done, as per dtr she was told that pt was too old for surgery as risk were high; shoulder healed in a deformed manner  - pt very guarded w/ shoulder  * ? acute fx of 10th rib on shoulder xray  - ortho consult appreciated  - lidoderm patch ordered on adm    Constipation  -  miralax / colace    Advanced directives -   - goals of care d/w with family / pt --> full code  Palliative care eval appreciated  poor prognosis   declined hospice care.     DVT proph - heparin
Metabolic encephalopathy due to UTI vs CVA vs worsening of dementia   - UA reviewed (+); ucx never done  Suspected UTI due to metcalf catheter  - MRI with small acute/subacute lacunar infarct  - TSH wnl  - appreciate neuro input  - c/w ASA, statins  - Continue PT  - D/c rocephin      HAILEY d/t UTI: renal function stable   -  h/o Urinary retention s/p Metcalf (initiated at last OSH encounter then continued at discharge to rehab)  - Restarted  IV fluids - gentle   - CT abd devoid of obstruction     Dyspnea: resolved    - CT chest negative for PNA and PE  - BLE US negative for DVT    Anemia chronic diseases  - iron studies reviewed  - con't oral supplemement  - bowel regimen as primary prevention     Narcolepsy   - modafinil 100 standing     HTN  - hold ARBs due to HAILEY  - c/w coreg 25 bid  - monitor    GERD  - c/w PPI    Dysphagia  - appreciate SLP input-- puree diet    Skin tears, pressure ulcers  - local wound care    L should fracture - chronic vs acute on chronic  as per pt's dtr, pt had L shoulder fx 5 yrs ago; non-invasive management with sling was done, as per dtr she was told that pt was too old for surgery as risk were high; shoulder healed in a deformed manner  - pt very guarded w/ shoulder  * ? acute fx of 10th rib on shoulder xray  - ortho consult appreciated  - lidoderm patch ordered on adm    Constipation  -  miralax / colace    Advanced directives -   - goals of care d/w with family / pt --> full code  Palliative care margaal appreciated  poor prognosis   initially declined hospice care. Now rethinking, follow    DVT proph - heparin
Metabolic encephalopathy due to UTI vs CVA vs worsening of dementia   - UA reviewed (+); ucx pending  - MRI with small acute/subacute lacunar infarct  - TSH wnl  - appreciate neuro input  - c/w ASA, statins  - Continue PT  - con't rocephin for one more day     HAILEY d/t UTI  -  h/o Urinary retention s/p Dill (initiated at last OSH encounter then continued at discharge to rehab)  - slight improvement in crt  - Restart IV fluids - gentle   - CT abd devoid of obstruction     Dyspnea    - CT chest negative for PNA and PE  - BLE US negative for DVT    Anemia chronic diseases  - iron studies reviewed  - con't oral supplemement  - bowel regimen as primary prevention     Narcolepsy   - modafinil 100 standing     HTN  - hold ARBs due to HAILEY  - hold nifedipine due to leg edema   - c/w coreg 25 bid  - monitor    GERD  - c/w PPI    Dysphagia  - appreciate SLP input-- puree diet  - nutrition consult ordered on adm    Skin tears, pressure ulcers  - local wound care    L should fracture - chronic vs acute on chronic  as per pt's dtr, pt had L shoulder fx 5 yrs ago; non-invasive management with sling was done, as per dtr she was told that pt was too old for surgery as risk were high; shoulder healed in a deformed manner  - pt very guarded w/ shoulder  * ? acute fx of 10th rib on shoulder xray  - ortho consult appreciated  - lidoderm patch ordered on adm    Constipation  -  miralax / colace    Advanced directives -   - goals of care d/w with family / pt --> full code  Palliative care eval    DVT proph - heparin
Metabolic encephalopathy due to UTI vs CVA vs worsening of dementia vs narcolepsy   - UA reviewed (+); ucx pending  - CTH negative for acute infarct/ICH; has large left mastoid air effusion   - awaiting MRI/MRA  - TSH wnl  - appreciate neuro input  - f/u TTE results  - c/w ASA, statins  - PT eval  - con't rocephin     HAILEY d/t UTI  -  h/o Urinary retention s/p Dill (initiated at last OSH encounter then continued at discharge to rehab)  - slight improvement in crt  - con't IV fluids - gentle   - CT abd devoid of obstruction     Dyspnea    - CT chest negative for PNA and PE  - BLE US negative for DVT    Anemia chronic diseases  - iron studies reviewed  - con't oral supplemement  - bowel regimen as primary prevention     Narcolepsy   - modafinil 100 standing     HTN  - hold ARBs due to HAILEY  - hold nifedipine due to leg edema   - c/w coreg 25 bid  - monitor    GERD  - c/w PPI    Dysphagia  - appreciate SLP input-- puree diet  - nutrition consult ordered on adm    Skin tears, pressure ulcers  - local wound care    L should fracture - chronic vs acute on chronic  as per pt's dtr, pt had L shoulder fx 5 yrs ago; non-invasive management with sling was done, as per dtr she was told that pt was too old for surgery as risk were high; shoulder healed in a deformed manner  - pt very guarded w/ shoulder  * ? acute fx of 10th rib on shoulder xray  - ortho consult ordered  - lidoderm patch ordered on adm    Constipation  -  miralax / colace    Advanced directives -   - goals of care d/w with family / pt --> full code    DVT proph - heparin

## 2018-07-25 NOTE — DISCHARGE NOTE ADULT - FUNCTIONAL SCREEN CURRENT LEVEL: AMBULATION, MLM
Pt ambulated into department for her daily Rocephin infusion for an ovarian abscess. Pt told RN that she followed up with her infectious disease doctor two days ago, and her MD told Pt today would be her last day of IV antibiotics and Pt would start her PO Augmentin starting tomorrow. RN reviewed Dr. Casas's note from 10/23/17, which corresponded with what Pt said. Pt declined having any new or acute symptoms since yesterday; continues to have fatigue and denied GI distress. PICC had + blood return prior, flushed briskly. RN programed Rocephin in OPIC pump, which was programed to run over 15 minutes, however Pt's infusion ran over 10 minutes. RN notified Pharmacist Madai regarding situation, who told RN that the pharmacy ran out of the 100 ml NS bags that Rocephin is typically mixed with and these were replaced with 50 ml bags of D5W. RN explained to pharmacist that the nursing team was not made aware of this change, and pharmacist Madai said that this quicker infusion would not cause harm to the Pt. RN explained situation to Pt, who acknowledged understanding. RN reviewed PICC removal discharge instructions with Pt. PICC removed in a sterile manner after infusion was complete, covered with an occlusive dressing. Pt observed by RN for 30 minutes after removal, no new symptoms observed or expressed at the end of observation; no bleeding noted. Pt told RN that she has a follow-up appointment with her Infectious Disease MD in two weeks, and does not need any further appointments at this time. Pt left department by self, appearing in great spirits and NAD.   (4) completely dependent

## 2018-07-25 NOTE — DISCHARGE NOTE ADULT - MEDICATION SUMMARY - MEDICATIONS TO STOP TAKING
I will STOP taking the medications listed below when I get home from the hospital:    NIFEdipine 30 mg oral tablet, extended release  -- 1 tab(s) by mouth once a day    Multiple Vitamins oral tablet  -- 1 tab(s) by mouth once a day    losartan 100 mg oral tablet  -- 1 tab(s) by mouth once a day    ferrous sulfate 325 mg (65 mg elemental iron) oral tablet  -- 1 tab(s) by mouth once a day    Lipitor 40 mg oral tablet  -- 1 tab(s) by mouth once a day (at bedtime)    aspirin 81 mg oral tablet  -- 1 tab(s) by mouth once a day

## 2018-07-25 NOTE — DISCHARGE NOTE ADULT - CARE PLAN
Principal Discharge DX:	Metabolic encephalopathy  Goal:	supportive care  Assessment and plan of treatment:	continue supportive/hospice care  Secondary Diagnosis:	UTI (urinary tract infection)  Goal:	resolved  Secondary Diagnosis:	Dementia  Goal:	supportive care  Assessment and plan of treatment:	continue supportive/hospice care  Secondary Diagnosis:	CVA (cerebral vascular accident)  Goal:	supportive care  Assessment and plan of treatment:	continue supportive/hospice care

## 2018-07-25 NOTE — DISCHARGE NOTE ADULT - COMMUNITY RESOURCES
[facility]([phone]); Provided [facility] Case Management Resource Folder

## 2018-07-26 VITALS
SYSTOLIC BLOOD PRESSURE: 142 MMHG | HEART RATE: 73 BPM | TEMPERATURE: 98 F | DIASTOLIC BLOOD PRESSURE: 44 MMHG | OXYGEN SATURATION: 98 % | RESPIRATION RATE: 18 BRPM

## 2018-07-26 RX ADMIN — MORPHINE SULFATE 2 MILLIGRAM(S): 50 CAPSULE, EXTENDED RELEASE ORAL at 00:12

## 2018-07-26 RX ADMIN — MORPHINE SULFATE 2 MILLIGRAM(S): 50 CAPSULE, EXTENDED RELEASE ORAL at 01:10

## 2018-07-26 RX ADMIN — CARVEDILOL PHOSPHATE 12.5 MILLIGRAM(S): 80 CAPSULE, EXTENDED RELEASE ORAL at 05:37

## 2018-07-26 RX ADMIN — PANTOPRAZOLE SODIUM 40 MILLIGRAM(S): 20 TABLET, DELAYED RELEASE ORAL at 05:37

## 2018-07-26 RX ADMIN — MORPHINE SULFATE 2 MILLIGRAM(S): 50 CAPSULE, EXTENDED RELEASE ORAL at 05:44

## 2018-07-26 RX ADMIN — POLYETHYLENE GLYCOL 3350 17 GRAM(S): 17 POWDER, FOR SOLUTION ORAL at 05:38

## 2018-07-26 RX ADMIN — Medication 650 MILLIGRAM(S): at 05:37

## 2018-07-26 RX ADMIN — LIDOCAINE 2 PATCH: 4 CREAM TOPICAL at 05:45

## 2018-07-26 RX ADMIN — MORPHINE SULFATE 2 MILLIGRAM(S): 50 CAPSULE, EXTENDED RELEASE ORAL at 12:11

## 2018-07-26 RX ADMIN — LIDOCAINE 2 PATCH: 4 CREAM TOPICAL at 12:13

## 2018-07-26 RX ADMIN — MUPIROCIN 1 APPLICATION(S): 20 OINTMENT TOPICAL at 05:45

## 2018-07-26 RX ADMIN — Medication 25 MICROGRAM(S): at 05:37

## 2018-07-26 RX ADMIN — HEPARIN SODIUM 5000 UNIT(S): 5000 INJECTION INTRAVENOUS; SUBCUTANEOUS at 05:37

## 2018-07-26 NOTE — PROGRESS NOTE ADULT - PROVIDER SPECIALTY LIST ADULT
Cardiology
Hospitalist
Infectious Disease
Cardiology
Hospitalist

## 2018-07-30 DIAGNOSIS — R33.9 RETENTION OF URINE, UNSPECIFIED: ICD-10-CM

## 2018-07-30 DIAGNOSIS — E03.9 HYPOTHYROIDISM, UNSPECIFIED: ICD-10-CM

## 2018-07-30 DIAGNOSIS — L89.90 PRESSURE ULCER OF UNSPECIFIED SITE, UNSPECIFIED STAGE: ICD-10-CM

## 2018-07-30 DIAGNOSIS — S42.302D UNSPECIFIED FRACTURE OF SHAFT OF HUMERUS, LEFT ARM, SUBSEQUENT ENCOUNTER FOR FRACTURE WITH ROUTINE HEALING: ICD-10-CM
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
## 2018-07-30 DIAGNOSIS — K59.00 CONSTIPATION, UNSPECIFIED: ICD-10-CM

## 2018-07-30 DIAGNOSIS — F32.9 MAJOR DEPRESSIVE DISORDER, SINGLE EPISODE, UNSPECIFIED: ICD-10-CM

## 2018-07-30 DIAGNOSIS — G93.41 METABOLIC ENCEPHALOPATHY: ICD-10-CM

## 2018-07-30 DIAGNOSIS — G47.411 NARCOLEPSY WITH CATAPLEXY: ICD-10-CM

## 2018-07-30 DIAGNOSIS — R13.10 DYSPHAGIA, UNSPECIFIED: ICD-10-CM

## 2018-07-30 DIAGNOSIS — I10 ESSENTIAL (PRIMARY) HYPERTENSION: ICD-10-CM
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
## 2018-07-30 DIAGNOSIS — E78.5 HYPERLIPIDEMIA, UNSPECIFIED: ICD-10-CM

## 2018-07-30 DIAGNOSIS — F03.90 UNSPECIFIED DEMENTIA WITHOUT BEHAVIORAL DISTURBANCE: ICD-10-CM

## 2018-07-30 DIAGNOSIS — E43 UNSPECIFIED SEVERE PROTEIN-CALORIE MALNUTRITION: ICD-10-CM

## 2018-07-30 DIAGNOSIS — N17.9 ACUTE KIDNEY FAILURE, UNSPECIFIED: ICD-10-CM
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
## 2018-07-30 DIAGNOSIS — N39.0 URINARY TRACT INFECTION, SITE NOT SPECIFIED: ICD-10-CM

## 2018-07-30 DIAGNOSIS — S22.32XA FRACTURE OF ONE RIB, LEFT SIDE, INITIAL ENCOUNTER FOR CLOSED FRACTURE: ICD-10-CM

## 2018-07-30 DIAGNOSIS — Z51.5 ENCOUNTER FOR PALLIATIVE CARE: ICD-10-CM

## 2018-07-30 DIAGNOSIS — I63.9 CEREBRAL INFARCTION, UNSPECIFIED: ICD-10-CM

## 2018-07-30 DIAGNOSIS — E61.1 IRON DEFICIENCY: ICD-10-CM

## 2018-07-30 DIAGNOSIS — R94.31 ABNORMAL ELECTROCARDIOGRAM [ECG] [EKG]: ICD-10-CM

## 2018-07-30 DIAGNOSIS — T83.511A INFECTION AND INFLAMMATORY REACTION DUE TO INDWELLING URETHRAL CATHETER, INITIAL ENCOUNTER: ICD-10-CM
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
## 2018-07-30 DIAGNOSIS — D64.9 ANEMIA, UNSPECIFIED: ICD-10-CM

## 2018-11-24 NOTE — PROGRESS NOTE ADULT - SUBJECTIVE AND OBJECTIVE BOX
Cardiology Progress Note  Patient is a 89y old  Female who presents with a chief complaint of lethargy.    HPI: 89 y/o female with PMHx of  long standing h/o depression was weaned by PCP  of desipramine recently, Narcolepsy , GERD, Urinary retention s/p Dill , Constipation, Iron deficiency  HTN, Hypothyroidism, Dysphagia  presents to the   from [facility]   with worsening of lethargy starting 3 weeks ago. Pt was seen at [facility] for weakness/dizziness 3 weeks ago, all work-up was negative .  As per daughter per history at the time of admission,   pt  was in and out of consciousness and unresponsive during hospitalization. MRI and other tests were all normal.  Patient is nonambulatory  recently , has h/o left arm fracture with deformity.  in ED T(F): 99.1 , Max: 99.1  HR: 86  (86 - 98) BP: 164/74 (129/80 - 167/72) RR: 18  (17 - 20) SpO2: 100%  (100% - 100%) on 4 L , troponin x 1 neg, TSH wnl, Cr 1.62, BUN 40 , Hb 10.9, alb 2.4, CK 25,   s/p 1 gm of ceftriaxone, CXR - ?LLL PNA, official reading is pending    -Pt seen and examined this am.  She denies any CP or SOB.    - No events last pm. SR on tele. No CP/SOB.     PAST MEDICAL & SURGICAL HISTORY:  Narcolepsy and cataplexy  Shoulder pain, left  Back pain  Hypothyroidism  Dysphagia  Urinary retention  HTN (hypertension)  Depression  No significant past surgical history    MEDICATIONS  (STANDING):  acetaminophen   Tablet. 650 milliGRAM(s) Oral every 8 hours  aspirin  chewable 81 milliGRAM(s) Oral daily  atorvastatin 40 milliGRAM(s) Oral at bedtime  carvedilol 12.5 milliGRAM(s) Oral every 12 hours  cefTRIAXone Injectable. 1000 milliGRAM(s) IV Push every 24 hours  dextrose 5% + sodium chloride 0.9% with potassium chloride 20 mEq/L 1000 milliLiter(s) (50 mL/Hr) IV Continuous <Continuous>  docusate sodium 100 milliGRAM(s) Oral three times a day  heparin  Injectable 5000 Unit(s) SubCutaneous every 12 hours  levothyroxine 25 MICROGram(s) Oral daily  lidocaine   Patch 2 Patch Transdermal daily  modafinil 100 milliGRAM(s) Oral daily  multivitamin 1 Tablet(s) Oral daily  mupirocin 2% Ointment 1 Application(s) Topical every 12 hours  pantoprazole    Tablet 40 milliGRAM(s) Oral before breakfast  polyethylene glycol 3350 17 Gram(s) Oral daily  senna 2 Tablet(s) Oral at bedtime    MEDICATIONS  (PRN):  aluminum hydroxide/magnesium hydroxide/simethicone Suspension 30 milliLiter(s) Oral every 4 hours PRN Dyspepsia  ondansetron Injectable 4 milliGRAM(s) IV Push every 6 hours PRN Nausea    FAMILY HISTORY: Family history of stroke (Mother)    SOCIAL HISTORY: unable to obtain from pt    REVIEW OF SYSTEMS:  CONSTITUTIONAL:    c/o altered conciousness  HEENT:  Eyes:  No visual changes.     ENT:  No epistaxis.  No sinus pain.    RESPIRATORY:  No cough.  No wheeze.  No hemoptysis.  No shortness of breath.  CARDIOVASCULAR:  No chest pains.  No palpitations. No shortness of breath, No orthopnea or PND.  GASTROINTESTINAL:  No abdominal pain.  No nausea or vomiting.    GENITOURINARY:    No hematuria.    MUSCULOSKELETAL:  No musculoskeletal pain.  No joint swelling.  No arthritis.  NEUROLOGICAL:  No tingling or numbness or weakness.    Vital Signs Last 24 Hrs  T(C): 36.6 (2018 05:01), Max: 37.3 (2018 17:22)  T(F): 97.9 (2018 05:01), Max: 99.1 (2018 17:22)  HR: 91 (2018 05:01) (86 - 99)  BP: 148/65 (2018 05:01) (107/49 - 167/72)  BP(mean): --  RR: 17 (2018 05:01) (17 - 20)  SpO2: 100% (2018 05:01) (100% - 100%)    PHYSICAL EXAM-    Constitutional: elderly female in no acute distress    Head: Head is normocephalic and atraumatic.      Neck: No jugular venous distention. No audible carotid bruits.     Cardiovascular: Regular rate and rhythm without S3, S4. No murmurs or rubs are appreciated.      Respiratory: Breath sounds are normal. No rales. No wheezing.    Abdomen: Soft, nontender, nondistended with positive bowel sounds.      Extremity: No tenderness. No  pitting edema     Neurologic: The patient is alert, was able to answer few questions.     Skin: multiple sites of ecchymoses and skin tears.     Psychiatric: The patient appears to be emotionally stable.      INTERPRETATION OF TELEMETRY: sinus rythm    ECG: Sinus rythm , non specific ST T changes.  normal QT interval    I&O's Detail      LABS:                        10.0   5.25  )-----------( 314      ( 2018 06:20 )             30.1         143  |  110<H>  |  40<H>  ----------------------------<  102<H>  3.7   |  22  |  1.62<H>    Ca    9.1      2018 14:24  Mg     2.0         TPro  6.6  /  Alb  2.4<L>  /  TBili  0.6  /  DBili  x   /  AST  16  /  ALT  38  /  AlkPhos  65      CARDIAC MARKERS ( 2018 22:04 )  <0.015 ng/mL / x     / 38 U/L / x     / x      CARDIAC MARKERS ( 2018 14:24 )  <0.015 ng/mL / x     / 25 U/L / x     / x          PT/INR - ( 2018 14:24 )   PT: 13.5 sec;   INR: 1.24 ratio         PTT - ( 2018 14:24 )  PTT:40.3 sec  Urinalysis Basic - ( 2018 13:32 )    Color: Yellow / Appearance: Slightly Turbid / S.010 / pH: x  Gluc: x / Ketone: Small  / Bili: Negative / Urobili: Negative mg/dL   Blood: x / Protein: 30 mg/dL / Nitrite: Positive   Leuk Esterase: Moderate / RBC: 6-10 /HPF / WBC >50   Sq Epi: x / Non Sq Epi: Few / Bacteria: Moderate      I&O's Summary    BNPSerum Pro-Brain Natriuretic Peptide: 583 pg/mL ( @ 14:24)    RADIOLOGY & ADDITIONAL STUDIES:  < from: Xray Chest 1 View AP/PA. (18 @ 14:18) >  EXAM:  XR CHEST AP OR PA 1V                            PROCEDURE DATE:  2018          INTERPRETATION:  XR CHEST AP OR PA 1V    Single AP view    HISTORY:  Altered mental status    Comparison:  Chest x-ray 2007    The cardiac silhouette is within normal limits. The lungs are clear. No   pleural abnormality. Chronic left humeral deformity.    IMPRESSION: No acute disease.        < from: Transthoracic Echocardiogram (18 @ 09:47) >   The left ventricle is normal in size, wall thickness, wall motion and   contractility.   Estimated left ventricular ejection fraction is 65-70 %.   Normal appearing left atrium.   Fibrocalcific changes noted to the Aortic valve leaflets with preserved   leaflet excursion.   No aortic regurgitation is present.   Fibrocalcific changes noted to the mitral valve leaflets with preserved   leaflet excursion.   Mild mitral annular calcification is present.   Mild (1+) mitral regurgitation is present.   EA reversal of the mitral inflow consistent with reduced compliance of   the   left ventricle
Chart and meds reviewed.  Patient seen and examined.    CC: weakness, lethargy    HPI: 89 y/o female with PMHx of  long standing h/o depression was weaned by PCP  of desipramine recently, Narcolepsy , GERD, Urinary retention s/p Metcalf, constipation, Iron deficiency, HTN, Hypothyroidism, Dysphagia; presented to  [facility] (located in [facility]), with  a 3 wk hx of worsening lethargy.  Pt reportedly was seen at [facility] for weakness/dizziness 3 weeks prior to her  ED arrival and reportedly  all work-up was negative . Dtr present on adm to the ED, reported that pt was in and out of consciousness and unresponsive during hospitalization at [place] and that MRI and other tests were all normal;  has h/o left arm fracture with deformity     HOSPITAL COURSE  18: Supine in bed, NAD:  Tele NSR with infrequent PVC's, rate 60-90; more alert and verbal late afternoon than when seen earlier this morning where she was hardly verbal & mostly stared. Family at bedside updated on plan of care and results of diagnostics thus far.  18: Patient seen and examined. Mood labile, crying,  at bed side. Discussed with  regarding management and d/c plan.     All systems reviewed and found to be negative with the exception of what has been described above.      MEDICATIONS  (STANDING):  acetaminophen   Tablet. 650 milliGRAM(s) Oral every 8 hours  aspirin  chewable 81 milliGRAM(s) Oral daily  atorvastatin 40 milliGRAM(s) Oral at bedtime  carvedilol 12.5 milliGRAM(s) Oral every 12 hours  cefTRIAXone Injectable. 1000 milliGRAM(s) IV Push every 24 hours  dextrose 5% + sodium chloride 0.9% with potassium chloride 20 mEq/L 1000 milliLiter(s) (50 mL/Hr) IV Continuous <Continuous>  docusate sodium 100 milliGRAM(s) Oral three times a day  heparin  Injectable 5000 Unit(s) SubCutaneous every 12 hours  levothyroxine 25 MICROGram(s) Oral daily  lidocaine   Patch 2 Patch Transdermal daily  modafinil 100 milliGRAM(s) Oral daily  multivitamin 1 Tablet(s) Oral daily  mupirocin 2% Ointment 1 Application(s) Topical every 12 hours  pantoprazole    Tablet 40 milliGRAM(s) Oral before breakfast  polyethylene glycol 3350 17 Gram(s) Oral daily  senna 2 Tablet(s) Oral at bedtime    MEDICATIONS  (PRN):  aluminum hydroxide/magnesium hydroxide/simethicone Suspension 30 milliLiter(s) Oral every 4 hours PRN Dyspepsia  ondansetron Injectable 4 milliGRAM(s) IV Push every 6 hours PRN Nausea      Vital Signs Last 24 Hrs  T(C): 36.4 (2018 10:09), Max: 36.7 (2018 17:06)  T(F): 97.6 (2018 10:09), Max: 98 (2018 17:06)  HR: 104 (2018 10:09) (98 - 104)  BP: 156/54 (2018 10:09) (138/68 - 156/54)  BP(mean): --  RR: 18 (2018 10:09) (18 - 18)  SpO2: 100% (2018 10:09) (93% - 100%)      PHYSICAL EXAM:  CONSTITUTIONAL: frail elderly, NAD  HEENT:  AT/NC; pupils equal, round, reactive, nonicteric, no oropharyngeal lesions, erythema, exudates, oral thrush  NECK:   supple, no carotid bruits, no palpable lymph nodes, no thyromegaly  CV:  +S1, +S2, regular  RESP:  slightly diminished breath sounds, no wheezing, rales, rhonchi, good air entry bilaterally  GI:  abdomen soft, NTND, normal BS, no bruits, no abdominal masses, no palpable masses  : metcalf patent with clr yellow urine (metcalf reportedly changed in ED)  MSK:   normal muscle tone, no atrophy, no rigidity, no contractions  EXT:   mal-alignment of Left shoulder; guarded , swollen, +tender, BLE trace pedal edema, no clubbing, no cyanosis, no edema, no calf pain, swelling or erythema  VASCULAR:  diminished pedal pulses  NEURO:  AAOX 2-3, no focal deficits  SKIN:  multiple superficial skin tear          Labs:                               10.0   5.25  )-----------( 314      ( 2018 06:20 )             30.1     2018 06:20    144    |  112    |  37     ----------------------------<  119    3.7     |  23     |  1.54     Ca    9.0        2018 06:20  Phos  2.7       2018 06:20  Mg     2.1       2018 06:20          CAPILLARY BLOOD GLUCOSE        CARDIAC MARKERS ( 2018 06:20 )  0.015 ng/mL / x     / 35 U/L / x     / x      CARDIAC MARKERS ( 2018 22:04 )  <0.015 ng/mL / x     / 38 U/L / x     / x          Urinalysis Basic - ( 2018 10:30 )    Color: Yellow / Appearance: Clear / S.020 / pH: x  Gluc: x / Ketone: Small  / Bili: Negative / Urobili: Negative mg/dL   Blood: x / Protein: 30 mg/dL / Nitrite: Negative   Leuk Esterase: Moderate / RBC: 3-5 /HPF / WBC 26-50   Sq Epi: x / Non Sq Epi: Few / Bacteria: Occasional
HPI: (history obtained from her daughter over the telephone)  Ms. Aleman is a 89 year old woman with chronic depression. Her family began to notice a change 6 weeks ago when Desipramine was weaned off. She had been on this medication for many years for depression. She became lethargic and was admitted to [facility]. Throughout the course of her hospitalization she had fluctuating mental status. At times she was alert and at other times she was unresponsive. She had a workup including CT brain, EEG x 2, MRI brain and lumbar puncture. Modafinil was added to her medication regimen.   Her daughter is not aware of a previous history of Narcolepsy. She does not believe that she has cataplexy. She does not believed that they mentioned that she had a UTI at [employer]. She was eventually discharged to [facility] where she continued to be lethargic so her daughter had her brought to [facility].  She was found to have a UTI upon admission and is now being treated with antibiotics.  Her daughter says that prior to 6 weeks ago she was "feeble" and walked with a walker but mostly sat in a chair or in bed during the day. However, she was cognitively intact. She is hard of hearing.     7/21/18 ; pt unable to give much information? expressive  aphasia able to follow commands crying for simple questions, doesn't  want to eat, wants PT , Denies of any headache, dizziness. MRI /MRA done acute / subacute infarct.    MEDICATIONS  (STANDING):  acetaminophen   Tablet. 650 milliGRAM(s) Oral every 8 hours  aspirin  chewable 81 milliGRAM(s) Oral daily  atorvastatin 40 milliGRAM(s) Oral at bedtime  carvedilol 12.5 milliGRAM(s) Oral every 12 hours  cefTRIAXone Injectable. 1000 milliGRAM(s) IV Push every 24 hours  dextrose 5% + sodium chloride 0.9% with potassium chloride 20 mEq/L 1000 milliLiter(s) (50 mL/Hr) IV Continuous <Continuous>  docusate sodium 100 milliGRAM(s) Oral three times a day  heparin  Injectable 5000 Unit(s) SubCutaneous every 12 hours  levothyroxine 25 MICROGram(s) Oral daily  lidocaine   Patch 2 Patch Transdermal daily  modafinil 100 milliGRAM(s) Oral daily  multivitamin 1 Tablet(s) Oral daily  mupirocin 2% Ointment 1 Application(s) Topical every 12 hours  pantoprazole    Tablet 40 milliGRAM(s) Oral before breakfast  polyethylene glycol 3350 17 Gram(s) Oral two times a day  senna 2 Tablet(s) Oral at bedtime      Vital Signs Last 24 Hrs  T(C): 36.5 (21 Jul 2018 04:13), Max: 36.7 (20 Jul 2018 17:06)  T(F): 97.7 (21 Jul 2018 04:13), Max: 98 (20 Jul 2018 17:06)  HR: 98 (21 Jul 2018 04:13) (85 - 101)  BP: 154/56 (21 Jul 2018 04:13) (138/68 - 156/77)  BP(mean): --  RR: 19 (20 Jul 2018 11:29) (19 - 19)  SpO2: 93% (21 Jul 2018 04:13) (93% - 100%)    Neurological exam:  HF: Awake and alert. Able to follow commands but difficulty to express.    CN: PHILLIP, EOMI, VFF, facial sensation normal, no NLFD, tongue midline, gag intact  Motor: + bradykinesia. Good strength in upper extremities.  gen weakness.  Sens: Intact to light touch     Reflexes: Symmetric,+1 , plantars with drawing   Coord:  no tremors  Gait/Balance: not tested                        10.0   5.25  )-----------( 314      ( 20 Jul 2018 06:20 )             30.1     07-20    144  |  112<H>  |  37<H>  ----------------------------<  119<H>  3.7   |  23  |  1.54<H>    Ca    9.0      20 Jul 2018 06:20  Phos  2.7     07-20  Mg     2.1     07-20    TPro  6.6  /  Alb  2.4<L>  /  TBili  0.6  /  DBili  x   /  AST  16  /  ALT  38  /  AlkPhos  65  07-19      07-20 Chol 142 LDL 76 HDL 31<L> Trig 174<H>    Radiology report:  - CT Head 7/19/18  < from: CT Head No Cont (07.19.18 @ 13:47) >  IMPRESSION:    No acute intracranial hemorrhage, mass effect, or midline shift.    Mild bilateral cerebral white matter microvascular changes.    Nonspecific large left mastoid air cell effusion.  - MRI brain 7/20/18  < from: MR Head No Cont (07.20.18 @ 16:02) >  IMPRESSION:    BRAIN MRI:  Acute-subacute subcentimeter infarct within the left precentral gyrus. No   acute intracranial hemorrhage or midline shift.    Redemonstration of large left tympanomastoid cavity effusion.    Chronically lacunar infarct within the right cerebellum.    BRAIN MRA:  No significant stenosis, occlusion, or aneurysm.    NECK MRA:  Tortuosity of the mid-distal right ICA, with focal loss of flow related   enhancement of the mid right ICA. This finding may represent artifact   secondary to in-plane signal loss vs high-grade stenosis. Although, there   is normal caliber of the distal cervical ICA. If there is persistent   clinical concern a neck CTA can be performed for further characterization.    No significant stenosis or occlusion of the bilateral common and left   internal carotid and bilateral vertebral arteries.    Results discussed with , by radiologist Dr. Neil at 5:15 PM on   July 20, 2018.
abd pain I was here for it oct 28 it was getting better now it is worse again
Cardiology Progress Note  Patient is a 89y old  Female who presents with a chief complaint of lethargy.    HPI: 89 y/o female with PMHx of  long standing h/o depression was weaned by PCP  of desipramine recently, Narcolepsy , GERD, Urinary retention s/p Dill , Constipation, Iron deficiency  HTN, Hypothyroidism, Dysphagia  presents to the   from [facility]   with worsening of lethargy starting 3 weeks ago. Pt was seen at [facility] for weakness/dizziness 3 weeks ago, all work-up was negative .  As per daughter per history at the time of admission,   pt  was in and out of consciousness and unresponsive during hospitalization. MRI and other tests were all normal.  Patient is nonambulatory  recently , has h/o left arm fracture with deformity.  in ED T(F): 99.1 , Max: 99.1  HR: 86  (86 - 98) BP: 164/74 (129/80 - 167/72) RR: 18  (17 - 20) SpO2: 100%  (100% - 100%) on 4 L , troponin x 1 neg, TSH wnl, Cr 1.62, BUN 40 , Hb 10.9, alb 2.4, CK 25,   s/p 1 gm of ceftriaxone, CXR - ?LLL PNA, official reading is pending    -Pt seen and examined this am.  She denies any CP or SOB.    - No events last pm. SR on tele. No CP/SOB.     - No events last pm. Resting in bed. SR on tele.     PAST MEDICAL & SURGICAL HISTORY:  Narcolepsy and cataplexy  Shoulder pain, left  Back pain  Hypothyroidism  Dysphagia  Urinary retention  HTN (hypertension)  Depression  No significant past surgical history    MEDICATIONS  (STANDING):  acetaminophen   Tablet. 650 milliGRAM(s) Oral every 8 hours  aspirin  chewable 81 milliGRAM(s) Oral daily  atorvastatin 40 milliGRAM(s) Oral at bedtime  carvedilol 12.5 milliGRAM(s) Oral every 12 hours  cefTRIAXone Injectable. 1000 milliGRAM(s) IV Push every 24 hours  dextrose 5% + sodium chloride 0.9% with potassium chloride 20 mEq/L 1000 milliLiter(s) (50 mL/Hr) IV Continuous <Continuous>  docusate sodium 100 milliGRAM(s) Oral three times a day  heparin  Injectable 5000 Unit(s) SubCutaneous every 12 hours  levothyroxine 25 MICROGram(s) Oral daily  lidocaine   Patch 2 Patch Transdermal daily  modafinil 100 milliGRAM(s) Oral daily  multivitamin 1 Tablet(s) Oral daily  mupirocin 2% Ointment 1 Application(s) Topical every 12 hours  pantoprazole    Tablet 40 milliGRAM(s) Oral before breakfast  polyethylene glycol 3350 17 Gram(s) Oral daily  senna 2 Tablet(s) Oral at bedtime    MEDICATIONS  (PRN):  aluminum hydroxide/magnesium hydroxide/simethicone Suspension 30 milliLiter(s) Oral every 4 hours PRN Dyspepsia  ondansetron Injectable 4 milliGRAM(s) IV Push every 6 hours PRN Nausea    FAMILY HISTORY: Family history of stroke (Mother)    SOCIAL HISTORY: unable to obtain from pt    REVIEW OF SYSTEMS:  CONSTITUTIONAL:    c/o altered conciousness  HEENT:  Eyes:  No visual changes.     ENT:  No epistaxis.  No sinus pain.    RESPIRATORY:  No cough.  No wheeze.  No hemoptysis.  No shortness of breath.  CARDIOVASCULAR:  No chest pains.  No palpitations. No shortness of breath, No orthopnea or PND.  GASTROINTESTINAL:  No abdominal pain.  No nausea or vomiting.    GENITOURINARY:    No hematuria.    MUSCULOSKELETAL:  No musculoskeletal pain.  No joint swelling.  No arthritis.  NEUROLOGICAL:  No tingling or numbness or weakness.    Vital Signs Last 24 Hrs  T(C): 36.6 (2018 05:01), Max: 37.3 (2018 17:22)  T(F): 97.9 (2018 05:01), Max: 99.1 (2018 17:22)  HR: 91 (2018 05:01) (86 - 99)  BP: 148/65 (2018 05:01) (107/49 - 167/72)  BP(mean): --  RR: 17 (2018 05:01) (17 - 20)  SpO2: 100% (2018 05:01) (100% - 100%)    PHYSICAL EXAM-    Constitutional: elderly female in no acute distress    Head: Head is normocephalic and atraumatic.      Neck: No jugular venous distention. No audible carotid bruits.     Cardiovascular: Regular rate and rhythm without S3, S4. No murmurs or rubs are appreciated.      Respiratory: Breath sounds are normal. No rales. No wheezing.    Abdomen: Soft, nontender, nondistended with positive bowel sounds.      Extremity: No tenderness. No  pitting edema     Neurologic: The patient is alert, was able to answer few questions.     Skin: multiple sites of ecchymoses and skin tears.     Psychiatric: The patient appears to be emotionally stable.      INTERPRETATION OF TELEMETRY: sinus rythm    ECG: Sinus rythm , non specific ST T changes.  normal QT interval    I&O's Detail      LABS:                        10.0   5.25  )-----------( 314      ( 2018 06:20 )             30.1         143  |  110<H>  |  40<H>  ----------------------------<  102<H>  3.7   |  22  |  1.62<H>    Ca    9.1      2018 14:24  Mg     2.0         TPro  6.6  /  Alb  2.4<L>  /  TBili  0.6  /  DBili  x   /  AST  16  /  ALT  38  /  AlkPhos  65      CARDIAC MARKERS ( 2018 22:04 )  <0.015 ng/mL / x     / 38 U/L / x     / x      CARDIAC MARKERS ( 2018 14:24 )  <0.015 ng/mL / x     / 25 U/L / x     / x          PT/INR - ( 2018 14:24 )   PT: 13.5 sec;   INR: 1.24 ratio         PTT - ( 2018 14:24 )  PTT:40.3 sec  Urinalysis Basic - ( 2018 13:32 )    Color: Yellow / Appearance: Slightly Turbid / S.010 / pH: x  Gluc: x / Ketone: Small  / Bili: Negative / Urobili: Negative mg/dL   Blood: x / Protein: 30 mg/dL / Nitrite: Positive   Leuk Esterase: Moderate / RBC: 6-10 /HPF / WBC >50   Sq Epi: x / Non Sq Epi: Few / Bacteria: Moderate      I&O's Summary    BNPSerum Pro-Brain Natriuretic Peptide: 583 pg/mL ( @ 14:24)    RADIOLOGY & ADDITIONAL STUDIES:  < from: Xray Chest 1 View AP/PA. (18 @ 14:18) >  EXAM:  XR CHEST AP OR PA 1V                            PROCEDURE DATE:  2018          INTERPRETATION:  XR CHEST AP OR PA 1V    Single AP view    HISTORY:  Altered mental status    Comparison:  Chest x-ray 2007    The cardiac silhouette is within normal limits. The lungs are clear. No   pleural abnormality. Chronic left humeral deformity.    IMPRESSION: No acute disease.        < from: Transthoracic Echocardiogram (18 @ 09:47) >   The left ventricle is normal in size, wall thickness, wall motion and   contractility.   Estimated left ventricular ejection fraction is 65-70 %.   Normal appearing left atrium.   Fibrocalcific changes noted to the Aortic valve leaflets with preserved   leaflet excursion.   No aortic regurgitation is present.   Fibrocalcific changes noted to the mitral valve leaflets with preserved   leaflet excursion.   Mild mitral annular calcification is present.   Mild (1+) mitral regurgitation is present.   EA reversal of the mitral inflow consistent with reduced compliance of   the   left ventricle
Chart and meds reviewed.  Patient seen and examined.    CC: weakness, lethargy    HPI: 89 y/o female with PMHx of  long standing h/o depression was weaned by PCP  of desipramine recently, Narcolepsy , GERD, Urinary retention s/p Metcalf, constipation, Iron deficiency, HTN, Hypothyroidism, Dysphagia; presented to  [facility] (located in [facility]), with  a 3 wk hx of worsening lethargy.  Pt reportedly was seen at [facility] for weakness/dizziness 3 weeks prior to her  ED arrival and reportedly  all work-up was negative . Dtr present on adm to the ED, reported that pt was in and out of consciousness and unresponsive during hospitalization at [place] and that MRI and other tests were all normal;  has h/o left arm fracture with deformity     HOSPITAL COURSE  7/20/18: Supine in bed, NAD:  Tele NSR with infrequent PVC's, rate 60-90; more alert and verbal late afternoon than when seen earlier this morning where she was hardly verbal & mostly stared. Family at bedside updated on plan of care and results of diagnostics thus far.  07/21/18: Patient seen and examined. Mood labile, crying,  at bed side. Discussed with  regarding management and d/c plan.   07/22/18: patient seen and examined. More awake and alert today, poor oral intake as per  at bed side.   07/23/18: Patient seen and examined. Very poor oral intake. Discussed with  at bed side regarding poor nutritional status and poor prognosis. She was seen by palliative care and hospice care was offered. He declined.     All systems reviewed and found to be negative with the exception of what has been described above.    Vital Signs Last 24 Hrs  T(C): 36.6 (23 Jul 2018 10:35), Max: 36.6 (23 Jul 2018 10:35)  T(F): 97.8 (23 Jul 2018 10:35), Max: 97.8 (23 Jul 2018 10:35)  HR: 96 (23 Jul 2018 10:35) (87 - 96)  BP: 156/59 (23 Jul 2018 10:35) (156/59 - 166/76)  BP(mean): --  RR: 17 (23 Jul 2018 10:35) (16 - 18)  SpO2: 100% (23 Jul 2018 10:35) (99% - 100%)        PHYSICAL EXAM:    CONSTITUTIONAL: frail elderly, NAD  HEENT:  AT/NC; pupils equal, round, reactive, nonicteric, no oropharyngeal lesions, erythema, exudates, oral thrush  NECK:   supple, no carotid bruits, no palpable lymph nodes, no thyromegaly  CV:  +S1, +S2, regular  RESP:  slightly diminished breath sounds, no wheezing, rales, rhonchi, good air entry bilaterally  GI:  abdomen soft, NTND, normal BS, no bruits, no abdominal masses, no palpable masses  : metcalf patent with clr yellow urine (metcalf reportedly changed in ED)  MSK:   normal muscle tone, no atrophy, no rigidity, no contractions  EXT:   mal-alignment of Left shoulder; guarded , swollen, +tender, BLE trace pedal edema, no clubbing, no cyanosis, no edema, no calf pain, swelling or erythema  VASCULAR:  diminished pedal pulses  NEURO:  AAOX 2-3, no focal deficits  SKIN:  multiple superficial skin tear          Labs:                23 Jul 2018 06:42    146    |  113    |  24     ----------------------------<  119    3.9     |  26     |  1.49     Ca    9.4        23 Jul 2018 06:42        MEDICATIONS:    acetaminophen   Tablet. 650 milliGRAM(s) Oral every 8 hours  aspirin  chewable 81 milliGRAM(s) Oral daily  atorvastatin 40 milliGRAM(s) Oral at bedtime  carvedilol 12.5 milliGRAM(s) Oral every 12 hours  cefTRIAXone Injectable. 1000 milliGRAM(s) IV Push every 24 hours  dextrose 5% + sodium chloride 0.9% with potassium chloride 20 mEq/L 1000 milliLiter(s) (50 mL/Hr) IV Continuous <Continuous>  docusate sodium 100 milliGRAM(s) Oral three times a day  heparin  Injectable 5000 Unit(s) SubCutaneous every 12 hours  levothyroxine 25 MICROGram(s) Oral daily  lidocaine   Patch 2 Patch Transdermal daily  modafinil 100 milliGRAM(s) Oral daily  multivitamin 1 Tablet(s) Oral daily  mupirocin 2% Ointment 1 Application(s) Topical every 12 hours  pantoprazole    Tablet 40 milliGRAM(s) Oral before breakfast  polyethylene glycol 3350 17 Gram(s) Oral two times a day  senna 2 Tablet(s) Oral at bedtime    MEDICATIONS  (PRN):  aluminum hydroxide/magnesium hydroxide/simethicone Suspension 30 milliLiter(s) Oral every 4 hours PRN Dyspepsia  ondansetron Injectable 4 milliGRAM(s) IV Push every 6 hours PRN Nausea
Chart and meds reviewed.  Patient seen and examined.    CC: weakness, lethargy    HPI: 89 y/o female with PMHx of  long standing h/o depression was weaned by PCP  of desipramine recently, Narcolepsy , GERD, Urinary retention s/p Metcalf, constipation, Iron deficiency, HTN, Hypothyroidism, Dysphagia; presented to  [facility] (located in [facility]), with  a 3 wk hx of worsening lethargy.  Pt reportedly was seen at [facility] for weakness/dizziness 3 weeks prior to her  ED arrival and reportedly  all work-up was negative . Dtr present on adm to the ED, reported that pt was in and out of consciousness and unresponsive during hospitalization at [place] and that MRI and other tests were all normal;  has h/o left arm fracture with deformity     HOSPITAL COURSE  7/20/18: Supine in bed, NAD:  Tele NSR with infrequent PVC's, rate 60-90; more alert and verbal late afternoon than when seen earlier this morning where she was hardly verbal & mostly stared. Family at bedside updated on plan of care and results of diagnostics thus far.  07/21/18: Patient seen and examined. Mood labile, crying,  at bed side. Discussed with  regarding management and d/c plan.   07/22/18: patient seen and examined. More awake and alert today, poor oral intake as per  at bed side.   07/23/18: Patient seen and examined. Very poor oral intake. Discussed with  at bed side regarding poor nutritional status and poor prognosis. She was seen by palliative care and hospice care was offered. He declined.   07/24/18: Patient seen and examined. Sitting in a chair, still with poor oral intake.  and family have to decide regarding hospice care.     All systems reviewed and found to be negative with the exception of what has been described above.    Vital Signs Last 24 Hrs  T(C): 36.3 (24 Jul 2018 11:27), Max: 37 (24 Jul 2018 05:19)  T(F): 97.4 (24 Jul 2018 11:27), Max: 98.6 (24 Jul 2018 05:19)  HR: 92 (24 Jul 2018 11:27) (86 - 92)  BP: 117/74 (24 Jul 2018 11:27) (117/74 - 156/53)  BP(mean): --  RR: 18 (24 Jul 2018 11:27) (18 - 18)  SpO2: 99% (24 Jul 2018 11:27) (86% - 100%)        PHYSICAL EXAM:    CONSTITUTIONAL: frail elderly, NAD  HEENT:  AT/NC; pupils equal, round, reactive, nonicteric, no oropharyngeal lesions, erythema, exudates, oral thrush  NECK:   supple, no carotid bruits, no palpable lymph nodes, no thyromegaly  CV:  +S1, +S2, regular  RESP:  slightly diminished breath sounds, no wheezing, rales, rhonchi, good air entry bilaterally  GI:  abdomen soft, NTND, normal BS, no bruits, no abdominal masses, no palpable masses  : metcalf patent with clr yellow urine (metcalf reportedly changed in ED)  MSK:   normal muscle tone, no atrophy, no rigidity, no contractions  EXT:   mal-alignment of Left shoulder; guarded , swollen, +tender, BLE trace pedal edema, no clubbing, no cyanosis, no edema, no calf pain, swelling or erythema  VASCULAR:  diminished pedal pulses  NEURO:  AAOX 2-3, no focal deficits  SKIN:  multiple superficial skin tear          Labs:             23 Jul 2018 06:42    146    |  113    |  24     ----------------------------<  119    3.9     |  26     |  1.49     Ca    9.4        23 Jul 2018 06:42                MEDICATIONS:    MEDICATIONS  (STANDING):  acetaminophen   Tablet. 650 milliGRAM(s) Oral every 8 hours  aspirin  chewable 81 milliGRAM(s) Oral daily  atorvastatin 40 milliGRAM(s) Oral at bedtime  carvedilol 12.5 milliGRAM(s) Oral every 12 hours  dextrose 5% + sodium chloride 0.9% with potassium chloride 20 mEq/L 1000 milliLiter(s) (50 mL/Hr) IV Continuous <Continuous>  dextrose 5% + sodium chloride 0.9%. 1000 milliLiter(s) (60 mL/Hr) IV Continuous <Continuous>  docusate sodium 100 milliGRAM(s) Oral three times a day  heparin  Injectable 5000 Unit(s) SubCutaneous every 12 hours  levothyroxine 25 MICROGram(s) Oral daily  lidocaine   Patch 2 Patch Transdermal daily  modafinil 100 milliGRAM(s) Oral daily  multivitamin 1 Tablet(s) Oral daily  mupirocin 2% Ointment 1 Application(s) Topical every 12 hours  pantoprazole    Tablet 40 milliGRAM(s) Oral before breakfast  polyethylene glycol 3350 17 Gram(s) Oral two times a day  senna 2 Tablet(s) Oral at bedtime    MEDICATIONS  (PRN):  aluminum hydroxide/magnesium hydroxide/simethicone Suspension 30 milliLiter(s) Oral every 4 hours PRN Dyspepsia  melatonin 5 milliGRAM(s) Oral at bedtime PRN Insomnia  ondansetron Injectable 4 milliGRAM(s) IV Push every 6 hours PRN Nausea
Chart and meds reviewed.  Patient seen and examined.    CC: weakness, lethargy    HPI: 89 y/o female with PMHx of  long standing h/o depression was weaned by PCP  of desipramine recently, Narcolepsy , GERD, Urinary retention s/p Metcalf, constipation, Iron deficiency, HTN, Hypothyroidism, Dysphagia; presented to  [facility] (located in [facility]), with  a 3 wk hx of worsening lethargy.  Pt reportedly was seen at [facility] for weakness/dizziness 3 weeks prior to her  ED arrival and reportedly  all work-up was negative . Dtr present on adm to the ED, reported that pt was in and out of consciousness and unresponsive during hospitalization at [place] and that MRI and other tests were all normal;  has h/o left arm fracture with deformity     HOSPITAL COURSE  18: Supine in bed, NAD:  Tele NSR with infrequent PVC's, rate 60-90; more alert and verbal late afternoon than when seen earlier this morning where she was hardly verbal & mostly stared. Family at bedside updated on plan of care and results of diagnostics thus far.  18: Patient seen and examined. Mood labile, crying,  at bed side. Discussed with  regarding management and d/c plan.   18: patient seen and examined. More awake and alert today, poor oral intake as per  at bed side.     All systems reviewed and found to be negative with the exception of what has been described above.    Vital Signs Last 24 Hrs  T(C): 36.7 (2018 11:31), Max: 36.8 (2018 04:12)  T(F): 98.1 (2018 11:31), Max: 98.2 (2018 04:12)  HR: 89 (2018 11:31) (70 - 132)  BP: 162/66 (2018 11:31) (142/80 - 162/66)  BP(mean): --  RR: 18 (2018 11:31) (18 - 20)  SpO2: 100% (2018 11:31) (97% - 100%)      PHYSICAL EXAM:  CONSTITUTIONAL: frail elderly, NAD  HEENT:  AT/NC; pupils equal, round, reactive, nonicteric, no oropharyngeal lesions, erythema, exudates, oral thrush  NECK:   supple, no carotid bruits, no palpable lymph nodes, no thyromegaly  CV:  +S1, +S2, regular  RESP:  slightly diminished breath sounds, no wheezing, rales, rhonchi, good air entry bilaterally  GI:  abdomen soft, NTND, normal BS, no bruits, no abdominal masses, no palpable masses  : metcalf patent with clr yellow urine (metcalf reportedly changed in ED)  MSK:   normal muscle tone, no atrophy, no rigidity, no contractions  EXT:   mal-alignment of Left shoulder; guarded , swollen, +tender, BLE trace pedal edema, no clubbing, no cyanosis, no edema, no calf pain, swelling or erythema  VASCULAR:  diminished pedal pulses  NEURO:  AAOX 2-3, no focal deficits  SKIN:  multiple superficial skin tear          Labs:              CAPILLARY BLOOD GLUCOSE            Urinalysis Basic - ( 2018 10:30 )    Color: Yellow / Appearance: Clear / S.020 / pH: x  Gluc: x / Ketone: Small  / Bili: Negative / Urobili: Negative mg/dL   Blood: x / Protein: 30 mg/dL / Nitrite: Negative   Leuk Esterase: Moderate / RBC: 3-5 /HPF / WBC 26-50   Sq Epi: x / Non Sq Epi: Few / Bacteria: Occasional        MEDICATIONS  (STANDING):  acetaminophen   Tablet. 650 milliGRAM(s) Oral every 8 hours  aspirin  chewable 81 milliGRAM(s) Oral daily  atorvastatin 40 milliGRAM(s) Oral at bedtime  carvedilol 12.5 milliGRAM(s) Oral every 12 hours  cefTRIAXone Injectable. 1000 milliGRAM(s) IV Push every 24 hours  dextrose 5% + sodium chloride 0.9% with potassium chloride 20 mEq/L 1000 milliLiter(s) (50 mL/Hr) IV Continuous <Continuous>  docusate sodium 100 milliGRAM(s) Oral three times a day  heparin  Injectable 5000 Unit(s) SubCutaneous every 12 hours  levothyroxine 25 MICROGram(s) Oral daily  lidocaine   Patch 2 Patch Transdermal daily  modafinil 100 milliGRAM(s) Oral daily  multivitamin 1 Tablet(s) Oral daily  mupirocin 2% Ointment 1 Application(s) Topical every 12 hours  pantoprazole    Tablet 40 milliGRAM(s) Oral before breakfast  polyethylene glycol 3350 17 Gram(s) Oral two times a day  senna 2 Tablet(s) Oral at bedtime    MEDICATIONS  (PRN):  aluminum hydroxide/magnesium hydroxide/simethicone Suspension 30 milliLiter(s) Oral every 4 hours PRN Dyspepsia  ondansetron Injectable 4 milliGRAM(s) IV Push every 6 hours PRN Nausea
Chart and meds reviewed.  Patient seen and examined.    CC: weakness, lethargy    HPI: 89 y/o female with PMHx of  long standing h/o depression was weaned by PCP  of desipramine recently, Narcolepsy , GERD, Urinary retention s/p Metcalf, constipation, Iron deficiency, HTN, Hypothyroidism, Dysphagia; presented to  [facility] (located in [facility]), with  a 3 wk hx of worsening lethargy.  Pt reportedly was seen at [facility] for weakness/dizziness 3 weeks prior to her  ED arrival and reportedly  all work-up was negative . Dtr present on adm to the ED, reported that pt was in and out of consciousness and unresponsive during hospitalization at [place] and that MRI and other tests were all normal;  has h/o left arm fracture with deformity     HOSPITAL COURSE  18: Supine in bed, NAD:  Tele NSR with infrequent PVC's, rate 60-90; more alert and verbal late afternoon than when seen earlier this morning where she was hardly verbal & mostly stared. Family at bedside updated on plan of care and results of diagnostics thus far.    All systems reviewed and found to be negative with the exception of what has been described above.      MEDICATIONS  (STANDING):  acetaminophen   Tablet. 650 milliGRAM(s) Oral every 8 hours  aspirin  chewable 81 milliGRAM(s) Oral daily  atorvastatin 40 milliGRAM(s) Oral at bedtime  carvedilol 12.5 milliGRAM(s) Oral every 12 hours  cefTRIAXone Injectable. 1000 milliGRAM(s) IV Push every 24 hours  dextrose 5% + sodium chloride 0.9% with potassium chloride 20 mEq/L 1000 milliLiter(s) (50 mL/Hr) IV Continuous <Continuous>  docusate sodium 100 milliGRAM(s) Oral three times a day  heparin  Injectable 5000 Unit(s) SubCutaneous every 12 hours  levothyroxine 25 MICROGram(s) Oral daily  lidocaine   Patch 2 Patch Transdermal daily  modafinil 100 milliGRAM(s) Oral daily  multivitamin 1 Tablet(s) Oral daily  mupirocin 2% Ointment 1 Application(s) Topical every 12 hours  pantoprazole    Tablet 40 milliGRAM(s) Oral before breakfast  polyethylene glycol 3350 17 Gram(s) Oral daily  senna 2 Tablet(s) Oral at bedtime    MEDICATIONS  (PRN):  aluminum hydroxide/magnesium hydroxide/simethicone Suspension 30 milliLiter(s) Oral every 4 hours PRN Dyspepsia  ondansetron Injectable 4 milliGRAM(s) IV Push every 6 hours PRN Nausea      VITALS:  Vital Signs Last 24 Hrs  T(C): 36.3 (2018 11:29), Max: 37.3 (2018 17:22)  T(F): 97.3 (2018 11:29), Max: 99.1 (2018 17:22)  HR: 85 (:29) (85 - 99)  BP: 156/77 (2018 11:) (107/49 - 164/74)  BP(mean): --  RR: 19 (2018 11:29) (17 - 19)  SpO2: 100% (2018 11:) (100% - 100%)      PHYSICAL EXAM:  CONSTITUTIONAL: frail elderly, NAD  HEENT:  AT/NC; pupils equal, round, reactive, nonicteric, no oropharyngeal lesions, erythema, exudates, oral thrush  NECK:   supple, no carotid bruits, no palpable lymph nodes, no thyromegaly  CV:  +S1, +S2, regular  RESP:  slightly diminished breath sounds, no wheezing, rales, rhonchi, good air entry bilaterally  GI:  abdomen soft, NTND, normal BS, no bruits, no abdominal masses, no palpable masses  : metcalf patent with clr yellow urine (metcalf reportedly changed in ED)  MSK:   normal muscle tone, no atrophy, no rigidity, no contractions  EXT:   mal-alignment of Left shoulder; guarded , swollen, +tender, BLE trace pedal edema, no clubbing, no cyanosis, no edema, no calf pain, swelling or erythema  VASCULAR:  diminished pedal pulses  NEURO:  AAOX 2-3, no focal deficits  SKIN:  multiple superficial skin tear    07-20                        10.0   5.25  )-----------( 314      ( 2018 06:20 )             30.1       144  |  112<H>  |  37<H>  ----------------------------<  119<H>  3.7   |  23  |  1.54<H>    Ca    9.0      2018 06:20  Phos  2.7     07-20  Mg     2.1     07-20    TPro  6.6  /  Alb  2.4<L>  /  TBili  0.6  /  DBili  x   /  AST  16  /  ALT  38  /  AlkPhos  65  -19    CARDIAC MARKERS ( 2018 06:20 )  0.015 ng/mL / x     / 35 U/L / x     / x      CARDIAC MARKERS ( 2018 22:04 )  <0.015 ng/mL / x     / 38 U/L / x     / x      CARDIAC MARKERS ( 2018 14:24 )  <0.015 ng/mL / x     / 25 U/L / x     / x        LIVER FUNCTIONS - ( 2018 14:24 )  Alb: 2.4 g/dL / Pro: 6.6 gm/dL / ALK PHOS: 65 U/L / ALT: 38 U/L / AST: 16 U/L / GGT: x           PT/INR - ( 2018 14:24 )   PT: 13.5 sec;   INR: 1.24 ratio      PTT - ( 2018 14:24 )  PTT:40.3 sec  Urinalysis Basic - ( 2018 13:32 )    Color: Yellow / Appearance: Slightly Turbid / S.010 / pH: x  Gluc: x / Ketone: Small  / Bili: Negative / Urobili: Negative mg/dL   Blood: x / Protein: 30 mg/dL / Nitrite: Positive   Leuk Esterase: Moderate / RBC: 6-10 /HPF / WBC >50   Sq Epi: x / Non Sq Epi: Few / Bacteria: Moderate    ABG - ( 2018 17:42 )  pH, Arterial: 7.54  pH, Blood: x     /  pCO2: 26    /  pO2: 139   / HCO3: 22    / Base Excess: .5    /  SaO2: 99        Serum Pro-Brain Natriuretic Peptide: 828 pg/mL (18 @ 06:20)  Serum Pro-Brain Natriuretic Peptide: 583 pg/mL (18 @ 14:24)
Chart and meds reviewed.  Patient seen and examined.    CC: weakness, lethargy    subjective: 7/26: Pt lying in bed, comfortable, alert but confused and unable to have a conversation, pt is non-verbal.      ROS: Unable to obtain secondary to pt being non-verbal.    Vital Signs Last 24 Hrs  T(C): 36.8 (26 Jul 2018 10:15), Max: 36.8 (26 Jul 2018 10:15)  T(F): 98.2 (26 Jul 2018 10:15), Max: 98.2 (26 Jul 2018 10:15)  HR: 73 (26 Jul 2018 10:15) (73 - 93)  BP: 142/44 (26 Jul 2018 10:15) (142/44 - 157/54)  BP(mean): --  RR: 18 (26 Jul 2018 10:15) (18 - 18)  SpO2: 98% (26 Jul 2018 10:15) (98% - 100%)    PHYSICAL EXAM:    CONSTITUTIONAL: frail elderly, NAD, non-verbal  HEENT:  AT/NC; pupils equal, round, reactive, nonicteric, no oropharyngeal lesions, erythema, exudates, oral thrush  NECK:   supple, no carotid bruits, no palpable lymph nodes, no thyromegaly  CV:  +S1, +S2, regular  RESP:  slightly diminished breath sounds, no wheezing, rales, rhonchi, good air entry bilaterally  GI:  abdomen soft, NTND, normal BS, no bruits, no abdominal masses, no palpable masses  : metcalf patent with clr yellow urine (metcalf reportedly changed in ED)  MSK:   normal muscle tone, no atrophy, no rigidity, no contractions  EXT:   mal-alignment of Left shoulder; guarded , swollen, +tender, BLE trace pedal edema, no clubbing, no cyanosis, no edema, no calf pain, swelling or erythema  VASCULAR:  diminished pedal pulses  NEURO:  AAOX 2-3, no focal deficits  SKIN:  multiple superficial skin tear    MEDICATIONS  (STANDING):  acetaminophen   Tablet. 650 milliGRAM(s) Oral every 8 hours  atorvastatin 40 milliGRAM(s) Oral at bedtime  carvedilol 12.5 milliGRAM(s) Oral every 12 hours  dextrose 5% + sodium chloride 0.9%. 1000 milliLiter(s) (60 mL/Hr) IV Continuous <Continuous>  docusate sodium 100 milliGRAM(s) Oral three times a day  levothyroxine 25 MICROGram(s) Oral daily  lidocaine   Patch 2 Patch Transdermal daily  modafinil 100 milliGRAM(s) Oral daily  morphine  - Injectable 2 milliGRAM(s) IV Push every 6 hours  multivitamin 1 Tablet(s) Oral daily  mupirocin 2% Ointment 1 Application(s) Topical every 12 hours  pantoprazole    Tablet 40 milliGRAM(s) Oral before breakfast  polyethylene glycol 3350 17 Gram(s) Oral two times a day  senna 2 Tablet(s) Oral at bedtime    MEDICATIONS  (PRN):  aluminum hydroxide/magnesium hydroxide/simethicone Suspension 30 milliLiter(s) Oral every 4 hours PRN Dyspepsia  melatonin 5 milliGRAM(s) Oral at bedtime PRN Insomnia  ondansetron Injectable 4 milliGRAM(s) IV Push every 6 hours PRN Nausea          07-25    148<H>  |  115<H>  |  20  ----------------------------<  131<H>  3.5   |  26  |  1.36<H>    Ca    8.7      25 Jul 2018 06:09      CAPILLARY BLOOD GLUCOSE          Assessment and Plan:   		   Metabolic encephalopathy due to UTI vs CVA vs worsening of dementia: Pt meets requirements for inpatient hospice.  She is debilitated and unable to take in PO, very poor appetite.  Pt requiring IV morphine for left shoulder fracture and generalized pain.  She will be going to inpatient hospice today.  Spoke to  at bedside who agrees with plan.  Advanced directives -   Palliative care eval appreciated.  Overall poor prognosis.
Patient is a 89y old  Female who presents with a chief complaint of lethargy (19 Jul 2018 17:23)    Date of service: 07-24-18 @ 17:46            ROS: no fever or chills; denies dizziness, no HA, no SOB or cough, no abdominal pain, no diarrhea or constipation; no dysuria, no urinary frequency, no legs pain, no rashes    MEDICATIONS  (STANDING):  acetaminophen   Tablet. 650 milliGRAM(s) Oral every 8 hours  aspirin  chewable 81 milliGRAM(s) Oral daily  atorvastatin 40 milliGRAM(s) Oral at bedtime  carvedilol 12.5 milliGRAM(s) Oral every 12 hours  dextrose 5% + sodium chloride 0.9% with potassium chloride 20 mEq/L 1000 milliLiter(s) (50 mL/Hr) IV Continuous <Continuous>  dextrose 5% + sodium chloride 0.9%. 1000 milliLiter(s) (60 mL/Hr) IV Continuous <Continuous>  docusate sodium 100 milliGRAM(s) Oral three times a day  heparin  Injectable 5000 Unit(s) SubCutaneous every 12 hours  levothyroxine 25 MICROGram(s) Oral daily  lidocaine   Patch 2 Patch Transdermal daily  modafinil 100 milliGRAM(s) Oral daily  multivitamin 1 Tablet(s) Oral daily  mupirocin 2% Ointment 1 Application(s) Topical every 12 hours  pantoprazole    Tablet 40 milliGRAM(s) Oral before breakfast  polyethylene glycol 3350 17 Gram(s) Oral two times a day  senna 2 Tablet(s) Oral at bedtime    MEDICATIONS  (PRN):  aluminum hydroxide/magnesium hydroxide/simethicone Suspension 30 milliLiter(s) Oral every 4 hours PRN Dyspepsia  melatonin 5 milliGRAM(s) Oral at bedtime PRN Insomnia  morphine  - Injectable 1 milliGRAM(s) IV Push every 6 hours PRN pain  ondansetron Injectable 4 milliGRAM(s) IV Push every 6 hours PRN Nausea      Vital Signs Last 24 Hrs  T(C): 36.8 (24 Jul 2018 16:31), Max: 37 (24 Jul 2018 05:19)  T(F): 98.2 (24 Jul 2018 16:31), Max: 98.6 (24 Jul 2018 05:19)  HR: 106 (24 Jul 2018 16:31) (86 - 106)  BP: 146/42 (24 Jul 2018 16:31) (117/74 - 156/53)  BP(mean): --  RR: 18 (24 Jul 2018 11:27) (18 - 18)  SpO2: 100% (24 Jul 2018 16:31) (86% - 100%)    Physical Exam:        PE:    Constitutional: frail looking  HEENT: NC/AT  Neck: supple; thyroid not palpable  Respiratory: respiratory effort normal; clear to auscultation  Cardiovascular: S1S2 regular, no murmurs  Abdomen: soft, not tender, not distended, positive BS; no liver or spleen organomegaly  Genitourinary: no suprapubic tenderness  Musculoskeletal: no muscle tenderness, no joint swelling  Neurological/ Psychiatric: lethargic  Skin: no rashes; no palpable lesions    Labs: all available labs reviewed                         Labs:    07-23    146<H>  |  113<H>  |  24<H>  ----------------------------<  119<H>  3.9   |  26  |  1.49<H>    Ca    9.4      23 Jul 2018 06:42             Cultures:       Culture - Blood (collected 07-20-18 @ 06:20)  Source: .Blood None  Preliminary Report (07-21-18 @ 10:01):    No growth to date.    Culture - Blood (collected 07-20-18 @ 06:20)  Source: .Blood None  Preliminary Report (07-21-18 @ 10:01):    No growth to date.    Culture - Urine (collected 07-19-18 @ 13:32)  Source: .Urine None  Final Report (07-21-18 @ 19:19):    >100,000 CFU/ml Klebsiella pneumoniae  Organism: Klebsiella pneumoniae (07-21-18 @ 19:19)  Organism: Klebsiella pneumoniae (07-21-18 @ 19:19)      -  Amikacin: S <=8      -  Amoxicillin/Clavulanic Acid: S <=8/4      -  Ampicillin: R >16 These ampicillin results predict results for amoxicillin      -  Ampicillin/Sulbactam: S <=4/2      -  Aztreonam: S <=4      -  Cefazolin: S <=2 This predicts results for oral agents cefaclor, cefdinir, cefpodoxime, cefprozil, cefuroxime axetil, cephalexin and locarbef for uncomplicated UTI. Note that some isolates may be susceptible to these agents while testing resistant to cefazolin.      -  Cefepime: S <=2      -  Cefoxitin: S <=4      -  Ceftriaxone: S <=1 Enterobacter, Citrobacter, and Serratia may develop resistance during prolonged therapy      -  Ciprofloxacin: S <=0.5      -  Ertapenem: S <=0.5      -  Gentamicin: S <=1      -  Imipenem: S <=1      -  Levofloxacin: S <=1      -  Meropenem: S <=1      -  Nitrofurantoin: I 64 Should not be used to treat pyelonephritis      -  Piperacillin/Tazobactam: S <=8      -  Tigecycline: S <=1      -  Tobramycin: S <=2      -  Trimethoprim/Sulfamethoxazole: S <=0.5/9.5      Method Type: DANTE          < from: CT Abdomen and Pelvis No Cont (07.19.18 @ 17:16) >    EXAM:  CT ABDOMEN AND PELVIS                          EXAM:  CT CHEST                            PROCEDURE DATE:  07/19/2018          INTERPRETATION:  CT CHEST, CT ABDOMEN AND PELVIS    HISTORY:  lethargy to r/o PNA    Technique: CT of the chest, abdomen, and pelvis is performed without oral   or intravenous contrast. Axial images are supplemented with coronal and   sagittal reformations. This study was performed using automatic exposure   control (radiation dose reduction software) to obtain adiagnostic image   quality scan with patient dose as low as reasonably achievable.    Contrast:     None    Comparison: None.    Findings:    LUNGS, AIRWAYS: The central airways are patent. The lungs are clear.   Small bibasilar nodules of uncertain chronicity.  PLEURA: No pleural abnormality.  HEART AND VESSELS: Normal heart size. No pericardial effusion. Coronary   artery calcifications are present. Normal caliber thoracic aorta.  MEDIASTINUM AND GUALBERTO: No adenopathy.  CHEST WALL: No masses.    LIVER: Normal.  SPLEEN: Normal.  PANCREAS: Normal.  GALLBLADDER/BILIARY TREE: Nondilated. Normal gallbladder.  ADRENALS: Normal.  KIDNEYS: No hydronephrosis or urinary tract calculi.  LYMPHADENOPATHY/RETROPERITONEUM: No adenopathy.  VASCULATURE: Aortoiliac atherosclerosis without aneurysm.  BOWEL: No bowel-related abnormality. Specifically, no bowel obstruction.  PELVIC VISCERA: No uterine or adnexal abnormality. Urinary bladder   collapsed around a Dill catheter balloon.  PELVIC LYMPH NODES: No pelvic adenopathy.  PERITONEUM/ABDOMINAL WALL: No free air or ascites. Moderate   fat-containing umbilical hernia.  SKELETAL: No acute bony abnormality.    IMPRESSION:     No acute pathology. No pneumonia.        < end of copied text >            Radiology: all available radiological tests reviewed    Advanced directives addressed: full resuscitation

## 2021-03-20 NOTE — PATIENT PROFILE ADULT. - PURPOSEFUL PROACTIVE ROUNDING
[FreeTextEntry1] : fasting blood work and general follow-up\par  [de-identified] : Patient is a 53 year old female with a past medical history as below who presents for fasting blood work and general follow-up. Patient is not regularly taking any prescription medications. She takes Multivitamin and Vitamin D (400 IU). Patient states screening colonoscopy is scheduled for May 2021 with gastroenterologist, Dr. Gee. Patient recently saw rheumatologist, Dr. Velasquez for further evaluation of diffuse joint pain/achiness/stiffness. She notes a FHx of lupus and RA. Blood work done at Dr. Velasquez's office revealed positive RNP, negative serologic work-up for SCL or Lupus, work-up for MCTD pending. Patient was referred to pulmonologist, Dr. Spencer to further discuss PFT, CXR, and CT chest to r/o ILD. Patient notes improvement in right foot, 3rd digit pain secondary to stress fracture/avascular necrosis. She had been using orthotics. Blood work done on 11/21/20 revealed slightly elevated PLT (414), elevated total cholesterol (270), elevated LDL (173), and elevated non-HDL cholesterol (181). Patient states she has been trying to maintain a well-balanced, low cholesterol/low fat diet.     Patient

## 2021-12-02 NOTE — DISCHARGE NOTE ADULT - NURSING SECTION COMPLETE
Problem: Behavioral Health Plan of Care  Goal: Adheres to Safety Considerations for Self and Others  Outcome: Improving     Problem: Sleep Disturbance  Goal: Adequate Sleep/Rest  Outcome: Improving    Patient slept through the night, without any concerns. Safety checks completed per unit policy. She had 7 hrs of sleep.      Patient/Caregiver provided printed discharge information.

## 2022-02-04 NOTE — DISCHARGE NOTE ADULT - HOSPITAL COURSE
No oral lesions; no gross abnormalities negative CC: weakness, lethargy    HPI: 89 y/o female with PMHx of  long standing h/o depression was weaned by PCP  of desipramine recently, Narcolepsy , GERD, Urinary retention s/p Metcalf, constipation, Iron deficiency, HTN, Hypothyroidism, Dysphagia; presented to  [facility] (located in [facility]), with  a 3 wk hx of worsening lethargy.  Pt reportedly was seen at [facility] for weakness/dizziness 3 weeks prior to her  ED arrival and reportedly  all work-up was negative . Dtr present on adm to the ED, reported that pt was in and out of consciousness and unresponsive during hospitalization at [place] and that MRI and other tests were all normal;  has h/o left arm fracture with deformity     HOSPITAL COURSE  7/20/18: Supine in bed, NAD:  Tele NSR with infrequent PVC's, rate 60-90; more alert and verbal late afternoon than when seen earlier this morning where she was hardly verbal & mostly stared. Family at bedside updated on plan of care and results of diagnostics thus far.  07/22/18: More awake and alert , poor oral intake as per  at bed side.   07/23/18: Very poor oral intake. Discussed with  at bed side regarding poor nutritional status and poor prognosis. She was seen by palliative care and hospice care was offered. He declined.   07/24/18: Pt sitting in a chair, still with poor oral intake.  and family have to decide regarding hospice care.   7/25: Family on board with hospice care as pt weak, overall not doing well with poor prognosis and failure to thrive.  Pt appears comfortable otherwise but unable to have conversation given cognitive impairment/encephalopathy/dementia/cva.   All systems reviewed and found to be negative with the exception of what has been described above.    Vital Signs Last 24 Hrs  T(C): 36.8 (25 Jul 2018 04:09), Max: 36.8 (24 Jul 2018 16:31)  T(F): 98.2 (25 Jul 2018 04:09), Max: 98.2 (24 Jul 2018 16:31)  HR: 88 (25 Jul 2018 04:09) (88 - 106)  BP: 142/82 (25 Jul 2018 04:09) (117/74 - 146/42)  RR: 18 (24 Jul 2018 11:27) (18 - 18)  SpO2: 100% (25 Jul 2018 04:09) (99% - 100%)    PHYSICAL EXAM:  CONSTITUTIONAL: frail elderly, NAD  HEENT:  AT/NC; pupils equal, round, reactive, nonicteric, no oropharyngeal lesions, erythema, exudates, oral thrush  NECK:   supple, no carotid bruits, no palpable lymph nodes, no thyromegaly  CV:  +S1, +S2, regular  RESP:  slightly diminished breath sounds, no wheezing, rales, rhonchi, good air entry bilaterally  GI:  abdomen soft, NTND, normal BS, no bruits, no abdominal masses, no palpable masses  : metcalf patent with clr yellow urine (metcalf reportedly changed in ED)  MSK:   normal muscle tone, no atrophy, no rigidity, no contractions  EXT:   mal-alignment of Left shoulder; guarded , swollen, +tender, BLE trace pedal edema, no clubbing, no cyanosis, no edema, no calf pain, swelling or erythema  VASCULAR:  diminished pedal pulses  NEURO:  AAOX 2-3, no focal deficits  SKIN:  multiple superficial skin tear    meds/labs: Reviewed      Assessment and Plan:   \		   Metabolic encephalopathy due to UTI vs CVA vs worsening of dementia   - UA reviewed (+); ucx never done  Suspected UTI due to metcalf catheter  - MRI with small acute/subacute lacunar infarct  - TSH wnl  - appreciate neuro input  - c/w ASA, statins  - Continue PT  - D/c rocephin      HAILEY d/t UTI: renal function stable   - h/o Urinary retention s/p Metcalf (initiated at last OSH encounter then continued at discharge to rehab). s/p IVFs. CT abd devoid of obstruction.     Dyspnea: resolved    - CT chest negative for PNA and PE  - BLE US negative for DVT    Anemia chronic diseases    Narcolepsy  - modafinil 100 standing    HTN - hold ARBs due to HAILEY - c/w coreg 25 bid    GERD - c/w PPI    Dysphagia - appreciate SLP input-- puree diet    Skin tears, pressure ulcers  - local wound care    L should fracture - chronic vs acute on chronic  as per pt's dtr, pt had L shoulder fx 5 yrs ago; non-invasive management with sling was done, as per dtr she was told that pt was too old for surgery as risk were high; shoulder healed in a deformed manner  - pt very guarded w/ shoulder  * ? acute fx of 10th rib on shoulder xray  - ortho consult appreciated  - lidoderm patch ordered on adm.  continue pain management.     Constipation -  miralax / colace    Advanced directives -   - goals of care d/w with family / pt --> full code  Palliative care eval appreciated.  Pt with poor prognosis.  initially declined hospice care. Now on board with plan for inpatient hospice with comfort measures.         Attending Statement: 40 minutes spent on total encounter; more than 50% of the visit was spent counseling and/or coordinating care by the attending physician.

## 2024-02-26 NOTE — SWALLOW BEDSIDE ASSESSMENT ADULT - SWALLOW EVAL: SECRETION MANAGEMENT
breathing is unlabored without accessory muscle use., normal breath sounds.
No drooling noted and strength of non nutritive cough was felt to be grossly functional.